# Patient Record
Sex: MALE | Race: WHITE | NOT HISPANIC OR LATINO | Employment: FULL TIME | URBAN - METROPOLITAN AREA
[De-identification: names, ages, dates, MRNs, and addresses within clinical notes are randomized per-mention and may not be internally consistent; named-entity substitution may affect disease eponyms.]

---

## 2021-07-10 ENCOUNTER — APPOINTMENT (OUTPATIENT)
Dept: CT IMAGING | Facility: HOSPITAL | Age: 31
End: 2021-07-10

## 2021-07-10 ENCOUNTER — HOSPITAL ENCOUNTER (EMERGENCY)
Facility: HOSPITAL | Age: 31
Discharge: HOME | End: 2021-07-10
Attending: EMERGENCY MEDICINE | Admitting: EMERGENCY MEDICINE

## 2021-07-10 VITALS
OXYGEN SATURATION: 100 % | HEIGHT: 76 IN | BODY MASS INDEX: 28.43 KG/M2 | HEART RATE: 95 BPM | RESPIRATION RATE: 18 BRPM | SYSTOLIC BLOOD PRESSURE: 155 MMHG | DIASTOLIC BLOOD PRESSURE: 94 MMHG | WEIGHT: 233.47 LBS | TEMPERATURE: 97.8 F

## 2021-07-10 DIAGNOSIS — N20.1 LEFT URETERAL CALCULUS: Primary | ICD-10-CM

## 2021-07-10 DIAGNOSIS — N20.0 KIDNEY STONE: ICD-10-CM

## 2021-07-10 LAB
ALBUMIN SERPL-MCNC: 4.1 G/DL (ref 3.5–5.2)
ALBUMIN/GLOB SERPL: 1.8 G/DL
ALP SERPL-CCNC: 126 U/L (ref 39–117)
ALT SERPL W P-5'-P-CCNC: 21 U/L (ref 1–41)
ANION GAP SERPL CALCULATED.3IONS-SCNC: 10.5 MMOL/L (ref 5–15)
AST SERPL-CCNC: 24 U/L (ref 1–40)
BACTERIA UR QL AUTO: ABNORMAL /HPF
BASOPHILS # BLD AUTO: 0.01 10*3/MM3 (ref 0–0.2)
BASOPHILS NFR BLD AUTO: 0.1 % (ref 0–1.5)
BILIRUB SERPL-MCNC: 0.4 MG/DL (ref 0–1.2)
BILIRUB UR QL STRIP: NEGATIVE
BUN SERPL-MCNC: 11 MG/DL (ref 6–20)
BUN/CREAT SERPL: 12.1 (ref 7–25)
CALCIUM SPEC-SCNC: 9.1 MG/DL (ref 8.6–10.5)
CHLORIDE SERPL-SCNC: 107 MMOL/L (ref 98–107)
CLARITY UR: CLEAR
CO2 SERPL-SCNC: 24.5 MMOL/L (ref 22–29)
COLOR UR: ABNORMAL
CREAT SERPL-MCNC: 0.91 MG/DL (ref 0.76–1.27)
DEPRECATED RDW RBC AUTO: 39.1 FL (ref 37–54)
EOSINOPHIL # BLD AUTO: 0.01 10*3/MM3 (ref 0–0.4)
EOSINOPHIL NFR BLD AUTO: 0.1 % (ref 0.3–6.2)
ERYTHROCYTE [DISTWIDTH] IN BLOOD BY AUTOMATED COUNT: 13.2 % (ref 12.3–15.4)
GFR SERPL CREATININE-BSD FRML MDRD: 96 ML/MIN/1.73
GLOBULIN UR ELPH-MCNC: 2.3 GM/DL
GLUCOSE SERPL-MCNC: 94 MG/DL (ref 65–99)
GLUCOSE UR STRIP-MCNC: NEGATIVE MG/DL
HCT VFR BLD AUTO: 40.4 % (ref 37.5–51)
HGB BLD-MCNC: 13.6 G/DL (ref 13–17.7)
HGB UR QL STRIP.AUTO: ABNORMAL
HOLD SPECIMEN: NORMAL
HOLD SPECIMEN: NORMAL
HYALINE CASTS UR QL AUTO: ABNORMAL /LPF
IMM GRANULOCYTES # BLD AUTO: 0.03 10*3/MM3 (ref 0–0.05)
IMM GRANULOCYTES NFR BLD AUTO: 0.4 % (ref 0–0.5)
KETONES UR QL STRIP: NEGATIVE
LEUKOCYTE ESTERASE UR QL STRIP.AUTO: NEGATIVE
LIPASE SERPL-CCNC: 29 U/L (ref 13–60)
LYMPHOCYTES # BLD AUTO: 4.05 10*3/MM3 (ref 0.7–3.1)
LYMPHOCYTES NFR BLD AUTO: 48.3 % (ref 19.6–45.3)
MCH RBC QN AUTO: 28.1 PG (ref 26.6–33)
MCHC RBC AUTO-ENTMCNC: 33.7 G/DL (ref 31.5–35.7)
MCV RBC AUTO: 83.5 FL (ref 79–97)
MONOCYTES # BLD AUTO: 0.82 10*3/MM3 (ref 0.1–0.9)
MONOCYTES NFR BLD AUTO: 9.8 % (ref 5–12)
NEUTROPHILS NFR BLD AUTO: 3.46 10*3/MM3 (ref 1.7–7)
NEUTROPHILS NFR BLD AUTO: 41.3 % (ref 42.7–76)
NITRITE UR QL STRIP: NEGATIVE
NRBC BLD AUTO-RTO: 0 /100 WBC (ref 0–0.2)
PH UR STRIP.AUTO: 5.5 [PH] (ref 5–8)
PLATELET # BLD AUTO: 186 10*3/MM3 (ref 140–450)
PMV BLD AUTO: 12 FL (ref 6–12)
POTASSIUM SERPL-SCNC: 3.9 MMOL/L (ref 3.5–5.2)
PROT SERPL-MCNC: 6.4 G/DL (ref 6–8.5)
PROT UR QL STRIP: NEGATIVE
RBC # BLD AUTO: 4.84 10*6/MM3 (ref 4.14–5.8)
RBC # UR: ABNORMAL /HPF
REF LAB TEST METHOD: ABNORMAL
SODIUM SERPL-SCNC: 142 MMOL/L (ref 136–145)
SP GR UR STRIP: 1.03 (ref 1–1.03)
SQUAMOUS #/AREA URNS HPF: ABNORMAL /HPF
UROBILINOGEN UR QL STRIP: ABNORMAL
WBC # BLD AUTO: 8.38 10*3/MM3 (ref 3.4–10.8)
WBC UR QL AUTO: ABNORMAL /HPF
WHOLE BLOOD HOLD SPECIMEN: NORMAL

## 2021-07-10 PROCEDURE — 25010000002 ONDANSETRON PER 1 MG: Performed by: EMERGENCY MEDICINE

## 2021-07-10 PROCEDURE — 99283 EMERGENCY DEPT VISIT LOW MDM: CPT

## 2021-07-10 PROCEDURE — 96376 TX/PRO/DX INJ SAME DRUG ADON: CPT

## 2021-07-10 PROCEDURE — 85025 COMPLETE CBC W/AUTO DIFF WBC: CPT

## 2021-07-10 PROCEDURE — 80053 COMPREHEN METABOLIC PANEL: CPT

## 2021-07-10 PROCEDURE — 25010000002 KETOROLAC TROMETHAMINE PER 15 MG: Performed by: EMERGENCY MEDICINE

## 2021-07-10 PROCEDURE — 96375 TX/PRO/DX INJ NEW DRUG ADDON: CPT

## 2021-07-10 PROCEDURE — 25010000002 HYDROMORPHONE 1 MG/ML SOLUTION: Performed by: EMERGENCY MEDICINE

## 2021-07-10 PROCEDURE — 74176 CT ABD & PELVIS W/O CONTRAST: CPT

## 2021-07-10 PROCEDURE — 74176 CT ABD & PELVIS W/O CONTRAST: CPT | Performed by: RADIOLOGY

## 2021-07-10 PROCEDURE — 96374 THER/PROPH/DIAG INJ IV PUSH: CPT

## 2021-07-10 PROCEDURE — 81001 URINALYSIS AUTO W/SCOPE: CPT | Performed by: EMERGENCY MEDICINE

## 2021-07-10 PROCEDURE — 83690 ASSAY OF LIPASE: CPT

## 2021-07-10 RX ORDER — KETOROLAC TROMETHAMINE 30 MG/ML
30 INJECTION, SOLUTION INTRAMUSCULAR; INTRAVENOUS ONCE
Status: COMPLETED | OUTPATIENT
Start: 2021-07-10 | End: 2021-07-10

## 2021-07-10 RX ORDER — PROPRANOLOL HYDROCHLORIDE 10 MG/1
10 TABLET ORAL 2 TIMES DAILY
COMMUNITY
End: 2021-07-30

## 2021-07-10 RX ORDER — ONDANSETRON 4 MG/1
4 TABLET, ORALLY DISINTEGRATING ORAL EVERY 8 HOURS PRN
Qty: 12 TABLET | Refills: 0 | Status: SHIPPED | OUTPATIENT
Start: 2021-07-10 | End: 2021-07-10 | Stop reason: SDUPTHER

## 2021-07-10 RX ORDER — ONDANSETRON 2 MG/ML
4 INJECTION INTRAMUSCULAR; INTRAVENOUS ONCE
Status: COMPLETED | OUTPATIENT
Start: 2021-07-10 | End: 2021-07-10

## 2021-07-10 RX ORDER — HYDROCODONE BITARTRATE AND ACETAMINOPHEN 5; 325 MG/1; MG/1
1 TABLET ORAL EVERY 6 HOURS PRN
Qty: 15 TABLET | Refills: 0 | Status: SHIPPED | OUTPATIENT
Start: 2021-07-10 | End: 2021-07-10 | Stop reason: SDUPTHER

## 2021-07-10 RX ORDER — HYDROCODONE BITARTRATE AND ACETAMINOPHEN 5; 325 MG/1; MG/1
1 TABLET ORAL EVERY 6 HOURS PRN
Qty: 15 TABLET | Refills: 0 | Status: SHIPPED | OUTPATIENT
Start: 2021-07-10 | End: 2021-07-30

## 2021-07-10 RX ORDER — SODIUM CHLORIDE 0.9 % (FLUSH) 0.9 %
10 SYRINGE (ML) INJECTION AS NEEDED
Status: DISCONTINUED | OUTPATIENT
Start: 2021-07-10 | End: 2021-07-10 | Stop reason: HOSPADM

## 2021-07-10 RX ORDER — ONDANSETRON 4 MG/1
4 TABLET, ORALLY DISINTEGRATING ORAL EVERY 8 HOURS PRN
Qty: 12 TABLET | Refills: 0 | Status: SHIPPED | OUTPATIENT
Start: 2021-07-10 | End: 2021-07-30

## 2021-07-10 RX ADMIN — HYDROMORPHONE HYDROCHLORIDE 1 MG: 1 INJECTION, SOLUTION INTRAMUSCULAR; INTRAVENOUS; SUBCUTANEOUS at 13:17

## 2021-07-10 RX ADMIN — KETOROLAC TROMETHAMINE 30 MG: 30 INJECTION, SOLUTION INTRAMUSCULAR at 11:47

## 2021-07-10 RX ADMIN — HYDROMORPHONE HYDROCHLORIDE 1 MG: 1 INJECTION, SOLUTION INTRAMUSCULAR; INTRAVENOUS; SUBCUTANEOUS at 14:55

## 2021-07-10 RX ADMIN — SODIUM CHLORIDE 1000 ML: 9 INJECTION, SOLUTION INTRAVENOUS at 11:39

## 2021-07-10 RX ADMIN — ONDANSETRON 4 MG: 2 INJECTION INTRAMUSCULAR; INTRAVENOUS at 11:39

## 2021-07-10 RX ADMIN — HYDROMORPHONE HYDROCHLORIDE 1 MG: 1 INJECTION, SOLUTION INTRAMUSCULAR; INTRAVENOUS; SUBCUTANEOUS at 11:39

## 2021-07-10 NOTE — ED NOTES
FLANK PAIN X6 HOURS.   HISTORY OF 8 KIDNEY STONES IN THE PAST.  HISTORY OF LITHOTRIPSY.  NORMALLY SEES DR. YOU AT [facility].  LAST STONE WAS 2 YEARS AGO.      Shaniqua Fernandez RN  07/10/21 1121

## 2021-07-10 NOTE — ED PROVIDER NOTES
"Time: 11:16 AM EDT  Arrived by: Car  Chief Complaint: Flank pain  History provided by: Patient  History is limited by: N/A    History of Present Illness:    Javier Arriola is a 31 y.o. male who presents to the emergency department today with complaints of left flank pain since 0500 this morning. The patient reports that he does have a history of kidney stones with his last episode two years ago. He has had kidney stones broken up surgically in the past, though he denies a history of any other abdominal surgeries.    The patient presently denies any nausea, vomiting, fever, or urinary symptoms. He sees [facility] in [place], [place]. He is currently waiting to see an endocrinologist in September 2021 for his Graves Disease and is currently taking propanolol. Per triage, the patient denies smoking, drinking, or drug use. There are no other acute complaints at this time.      History provided by:  Patient   used: No    Flank Pain  Pain location:  L flank  Pain quality comment:  \"pain\"  Pain radiates to:  Does not radiate  Pain severity:  Moderate  Onset quality:  Sudden  Duration:  6 hours  Timing:  Constant  Progression:  Unchanged  Chronicity:  Recurrent (History of kidney stones)  Context: awakening from sleep    Context: not alcohol use    Relieved by:  None tried  Worsened by:  Nothing  Ineffective treatments:  None tried  Associated symptoms: no chest pain, no chills, no cough, no diarrhea, no dysuria, no fever, no hematuria, no nausea, no shortness of breath and no vomiting    Risk factors: no alcohol abuse, not elderly and has not had multiple surgeries            Similar Symptoms Previously: Yes.  Recently seen: Per his records, the patient had [facility].      Patient Care Team  Primary Care Provider: Joellen Rojas    Past Medical History:     Allergies   Allergen Reactions   • Tetracyclines & Related Hives     Past Medical History: " "  Diagnosis Date   • Graves disease    • Kidney stone      History reviewed. No pertinent surgical history.  History reviewed. No pertinent family history.    Home Medications:  Prior to Admission medications    Not on File        Social History:   PT  reports that he has never smoked. He has never used smokeless tobacco. He reports that he does not drink alcohol and does not use drugs.    Record Review:  I have reviewed the patient's records in [email or web address].     Review of Systems  Review of Systems   Constitutional: Negative for chills and fever.   HENT: Negative for nosebleeds.    Eyes: Negative for redness.   Respiratory: Negative for cough and shortness of breath.    Cardiovascular: Negative for chest pain.   Gastrointestinal: Negative for diarrhea, nausea and vomiting.   Genitourinary: Positive for flank pain (left). Negative for decreased urine volume, difficulty urinating, dysuria, frequency, hematuria and urgency.   Musculoskeletal: Negative for back pain and neck pain.   Skin: Negative for rash.   Neurological: Negative for seizures.   All other systems reviewed and are negative.       Physical Exam  /91   Pulse 81   Temp 97.5 °F (36.4 °C) (Oral)   Resp 18   Ht 193 cm (76\")   Wt 106 kg (233 lb 7.5 oz)   SpO2 99%   BMI 28.42 kg/m²     Physical Exam  Vitals and nursing note reviewed.   Constitutional:       General: He is in acute distress (moderate).   HENT:      Head: Normocephalic and atraumatic.      Nose: Nose normal.      Mouth/Throat:      Mouth: Mucous membranes are moist.   Eyes:      General: No scleral icterus.  Cardiovascular:      Rate and Rhythm: Normal rate and regular rhythm.      Heart sounds: Normal heart sounds. No murmur heard.     Pulmonary:      Effort: No respiratory distress.      Breath sounds: Normal breath sounds.   Abdominal:      Palpations: Abdomen is soft.      Tenderness: There is left CVA tenderness. There is no right CVA tenderness.      Hernia: No hernia is present.     " " Comments: No other tenderness.   Musculoskeletal:         General: No tenderness. Normal range of motion.      Cervical back: Normal range of motion and neck supple. No tenderness.      Right lower leg: No edema.      Left lower leg: No edema.   Skin:     General: Skin is warm and dry.   Neurological:      Mental Status: He is alert. Mental status is at baseline.      Sensory: No sensory deficit.      Motor: No weakness.   Psychiatric:         Behavior: Behavior normal.                  ED Course  /91   Pulse 81   Temp 97.5 °F (36.4 °C) (Oral)   Resp 18   Ht 193 cm (76\")   Wt 106 kg (233 lb 7.5 oz)   SpO2 99%   BMI 28.42 kg/m²   Results for orders placed or performed during the hospital encounter of 07/10/21   Comprehensive Metabolic Panel    Specimen: Blood   Result Value Ref Range    Glucose 94 65 - 99 mg/dL    BUN 11 6 - 20 mg/dL    Creatinine 0.91 0.76 - 1.27 mg/dL    Sodium 142 136 - 145 mmol/L    Potassium 3.9 3.5 - 5.2 mmol/L    Chloride 107 98 - 107 mmol/L    CO2 24.5 22.0 - 29.0 mmol/L    Calcium 9.1 8.6 - 10.5 mg/dL    Total Protein 6.4 6.0 - 8.5 g/dL    Albumin 4.10 3.50 - 5.20 g/dL    ALT (SGPT) 21 1 - 41 U/L    AST (SGOT) 24 1 - 40 U/L    Alkaline Phosphatase 126 (H) 39 - 117 U/L    Total Bilirubin 0.4 0.0 - 1.2 mg/dL    eGFR Non African Amer 96 >60 mL/min/1.73    Globulin 2.3 gm/dL    A/G Ratio 1.8 g/dL    BUN/Creatinine Ratio 12.1 7.0 - 25.0    Anion Gap 10.5 5.0 - 15.0 mmol/L   Lipase    Specimen: Blood   Result Value Ref Range    Lipase 29 13 - 60 U/L   Urinalysis With Microscopic If Indicated (No Culture) -    Specimen: Urine   Result Value Ref Range    Color, UA Dark Yellow (A) Yellow, Straw    Appearance, UA Clear Clear    pH, UA 5.5 5.0 - 8.0    Specific Gravity, UA 1.026 1.005 - 1.030    Glucose, UA Negative Negative    Ketones, UA Negative Negative    Bilirubin, UA Negative Negative    Blood, UA Moderate (2+) (A) Negative    Protein, UA Negative Negative    Leuk Esterase, UA " Negative Negative    Nitrite, UA Negative Negative    Urobilinogen, UA 1.0 E.U./dL 0.2 - 1.0 E.U./dL   CBC Auto Differential    Specimen: Blood   Result Value Ref Range    WBC 8.38 3.40 - 10.80 10*3/mm3    RBC 4.84 4.14 - 5.80 10*6/mm3    Hemoglobin 13.6 13.0 - 17.7 g/dL    Hematocrit 40.4 37.5 - 51.0 %    MCV 83.5 79.0 - 97.0 fL    MCH 28.1 26.6 - 33.0 pg    MCHC 33.7 31.5 - 35.7 g/dL    RDW 13.2 12.3 - 15.4 %    RDW-SD 39.1 37.0 - 54.0 fl    MPV 12.0 6.0 - 12.0 fL    Platelets 186 140 - 450 10*3/mm3    Neutrophil % 41.3 (L) 42.7 - 76.0 %    Lymphocyte % 48.3 (H) 19.6 - 45.3 %    Monocyte % 9.8 5.0 - 12.0 %    Eosinophil % 0.1 (L) 0.3 - 6.2 %    Basophil % 0.1 0.0 - 1.5 %    Immature Grans % 0.4 0.0 - 0.5 %    Neutrophils, Absolute 3.46 1.70 - 7.00 10*3/mm3    Lymphocytes, Absolute 4.05 (H) 0.70 - 3.10 10*3/mm3    Monocytes, Absolute 0.82 0.10 - 0.90 10*3/mm3    Eosinophils, Absolute 0.01 0.00 - 0.40 10*3/mm3    Basophils, Absolute 0.01 0.00 - 0.20 10*3/mm3    Immature Grans, Absolute 0.03 0.00 - 0.05 10*3/mm3    nRBC 0.0 0.0 - 0.2 /100 WBC   Urinalysis, Microscopic Only - Urine, Clean Catch    Specimen: Urine   Result Value Ref Range    RBC, UA 31-50 (A) None Seen /HPF    WBC, UA 0-2 (A) None Seen /HPF    Bacteria, UA None Seen None Seen /HPF    Squamous Epithelial Cells, UA 0-2 None Seen, 0-2 /HPF    Hyaline Casts, UA 3-6 None Seen /LPF    Methodology Automated Microscopy    Green Top (Gel)   Result Value Ref Range    Extra Tube Hold for add-ons.    Lavender Top   Result Value Ref Range    Extra Tube hold for add-on    Gold Top - SST   Result Value Ref Range    Extra Tube Hold for add-ons.      Medications   sodium chloride 0.9 % flush 10 mL (has no administration in time range)   HYDROmorphone (DILAUDID) injection 1 mg (1 mg Intravenous Given 7/10/21 1139)   ondansetron (ZOFRAN) injection 4 mg (4 mg Intravenous Given 7/10/21 1139)   ketorolac (TORADOL) injection 30 mg (30 mg Intravenous Given 7/10/21 1147)    sodium chloride 0.9 % bolus 1,000 mL (1,000 mL Intravenous New Bag 7/10/21 1139)   HYDROmorphone (DILAUDID) injection 1 mg (1 mg Intravenous Given 7/10/21 1317)     CT Abdomen Pelvis Without Contrast    Result Date: 7/10/2021  Narrative: PROCEDURE: CT ABDOMEN PELVIS WO CONTRAST  COMPARISON: None  INDICATIONS: Flank pain, kidney stone suspected  TECHNIQUE: CT images were created without intravenous contrast.   PROTOCOL:   Standard imaging protocol performed    RADIATION:   DLP: 699.5 mGy*cm   Automated exposure control was utilized to minimize radiation dose.  FINDINGS:  The lung bases are clear bilaterally.  The liver, gallbladder, spleen, pancreas and adrenal glands appear unremarkable.  A few punctate nonobstructing right renal stones are noted.  In the proximal left ureter is a 0.4 cm stone.  Mild left hydronephrosis is noted.  Both kidneys otherwise appear unremarkable.  No adenopathy is seen.  No free fluid is present.  No acute bowel abnormality is seen.  The lack of oral contrast limits evaluation of the bowel.  No focal osseous lesion is identified.  CONCLUSION:  1. 0.4 cm proximal left ureteral stone producing mild hydronephrosis 2. Punctate nonobstructing right renal stones     John Swain M.D.       Electronically Signed and Approved By: John Swain M.D. on 7/10/2021 at 12:30               Procedures/EKGs:  Procedures          Medical Decision Making:                     MDM  Number of Diagnoses or Management Options  Diagnosis management comments: Patient is pain-free after pain medication and IV fluids in the emergency room.  The patient has a point for centimeter calculus in the left proximal ureter.  The patient has no signs of infection subsequently be discharged home on pain medications he is also instructed drink plenty fluids, strain his urine, return for persistent vomiting fever or uncontrolled pain.  Otherwise he is to follow-up with Dr. Segura as directed       Amount and/or Complexity of  Data Reviewed  Clinical lab tests: reviewed  Tests in the radiology section of CPT®: reviewed    Patient Progress  Patient progress: improved       Final diagnoses:   Left ureteral calculus   Kidney stone        Disposition:  ED Disposition     ED Disposition Condition Comment    Discharge Stable           Documentation assistance provided by Deanna Frank acting as scribe for Yifan Poole DO. Information recorded by the scribe was done at my direction and has been verified and validated by me.        Deanna Frank  07/10/21 1134              Yifan Poole DO  07/10/21 1425

## 2021-07-30 ENCOUNTER — OFFICE VISIT (OUTPATIENT)
Dept: ORTHOPEDIC SURGERY | Facility: CLINIC | Age: 31
End: 2021-07-30

## 2021-07-30 VITALS — TEMPERATURE: 98 F | WEIGHT: 233.69 LBS | BODY MASS INDEX: 28.46 KG/M2 | HEIGHT: 76 IN

## 2021-07-30 DIAGNOSIS — M54.50 LUMBAR PAIN: ICD-10-CM

## 2021-07-30 DIAGNOSIS — R52 PAIN: ICD-10-CM

## 2021-07-30 DIAGNOSIS — M54.32 SCIATICA OF LEFT SIDE: Primary | ICD-10-CM

## 2021-07-30 DIAGNOSIS — M43.16 SPONDYLOLISTHESIS OF LUMBAR REGION: ICD-10-CM

## 2021-07-30 DIAGNOSIS — M51.26 HERNIATED LUMBAR INTERVERTEBRAL DISC: ICD-10-CM

## 2021-07-30 PROCEDURE — 72100 X-RAY EXAM L-S SPINE 2/3 VWS: CPT | Performed by: ORTHOPAEDIC SURGERY

## 2021-07-30 PROCEDURE — 99204 OFFICE O/P NEW MOD 45 MIN: CPT | Performed by: ORTHOPAEDIC SURGERY

## 2021-07-30 RX ORDER — OXYCODONE HYDROCHLORIDE AND ACETAMINOPHEN 5; 325 MG/1; MG/1
TABLET ORAL
COMMUNITY
Start: 2021-07-28 | End: 2021-09-21

## 2021-08-16 ENCOUNTER — TELEPHONE (OUTPATIENT)
Dept: ORTHOPEDIC SURGERY | Facility: CLINIC | Age: 31
End: 2021-08-16

## 2021-08-16 ENCOUNTER — TELEPHONE (OUTPATIENT)
Dept: PHYSICAL THERAPY | Facility: CLINIC | Age: 31
End: 2021-08-16

## 2021-08-16 NOTE — TELEPHONE ENCOUNTER
Patient is requesting a work note to keep him off work until his next follow up on 10-1-21. Please advise.

## 2021-08-17 NOTE — TELEPHONE ENCOUNTER
No I will keep him off 1 month from the time I saw him.  I told to make a follow-up appointment in 1 month.  Not 2-1/2 months.

## 2021-08-17 NOTE — TELEPHONE ENCOUNTER
Patient called back and rescheduled his follow up to 9-21-21 at [place], which was the next avaialble. Can he have a work note keeping him off until this appointment since it was the soonest he could get in? Please advise.

## 2021-08-23 NOTE — TELEPHONE ENCOUNTER
Patient informed of JGW prior message verbalizing understanding and with no further questions or concerns.

## 2021-09-08 ENCOUNTER — TREATMENT (OUTPATIENT)
Dept: PHYSICAL THERAPY | Facility: CLINIC | Age: 31
End: 2021-09-08

## 2021-09-08 DIAGNOSIS — M54.50 LUMBAR PAIN: Primary | ICD-10-CM

## 2021-09-08 PROCEDURE — 97161 PT EVAL LOW COMPLEX 20 MIN: CPT | Performed by: PHYSICAL THERAPIST

## 2021-09-08 PROCEDURE — 97110 THERAPEUTIC EXERCISES: CPT | Performed by: PHYSICAL THERAPIST

## 2021-09-08 NOTE — PROGRESS NOTES
Physical Therapy Initial Evaluation and Plan of Care      Patient: Javier Arriola   : 1990  Diagnosis/ICD-10 Code:  Lumbar pain [M54.5]  Referring practitioner: Sebastien Lozada MD  Date of Initial Visit: 2021  Today's Date: 2021  Patient seen for 1 sessions           Subjective Questionnaire: Oswestry: 24/50=48% limitation      Subjective Evaluation    History of Present Illness  Mechanism of injury: Patient is a 31 yr old male referred to physical therapy with diagnosis of lumbar pain.  Patient injured back in 2021 at work.  Patient states pain/burning in low back and some tingling in left leg.      Pain  Current pain ratin  At best pain ratin  At worst pain ratin  Quality: burning and grinding  Relieving factors: medications, change in position, ice and heat    Patient Goals  Patient goals for therapy: decreased pain, return to work, increased motion and independence with ADLs/IADLs             Objective          Static Posture     Scapulae  Left winging and right winging.    Thoracic Spine  Flattened thoracic spine.    Lumbar Spine   Increased lordosis.     Palpation   Left   Tenderness of the erector spinae and quadratus lumborum.     Right Tenderness of the erector spinae and quadratus lumborum.     Neurological Testing     Additional Neurological Details  Noted neural tension in left LE    Active Range of Motion     Lumbar   Flexion: with pain  Extension: with pain  Left lateral flexion: with pain  Right lateral flexion: with pain  Left rotation: with pain  Right rotation: with pain    Additional Active Range of Motion Details  Patient demonstrates 25% of lumbar AROM secondary to pain.    Strength/Myotome Testing     Left Hip   Planes of Motion   Flexion: 4-  Extension: 4-  Abduction: 4-    Right Hip   Planes of Motion   Flexion: 4-  Extension: 4-  Abduction: 4-    Left Knee   Flexion: 4  Extension: 4    Right Knee   Flexion: 4  Extension: 4    Left Ankle/Foot    Dorsiflexion: 4    Right Ankle/Foot   Dorsiflexion: 4    Lumbar Flexibility Comments:   Noted bilateral hamstring tightness          Assessment & Plan     Assessment  Impairments: activity intolerance, impaired physical strength, lacks appropriate home exercise program and pain with function  Assessment details: Pt presents with limitations, noted by evaluation that impede patient's tolerance to functional mobility/activity.  The skills of a therapist will be required to safely and effectively implement the following treatment plan to restore maximal level of function.    Prognosis: good  Functional Limitations: carrying objects, lifting, walking, uncomfortable because of pain, standing and unable to perform repetitive tasks  Goals  Plan Goals: 1. The patient has complaints of pain.   LTG 1: 8 weeks:  The patient will report lower back pain no greater than 2/10 in order to improve tolerance with activities of daily living and improve sleep quality.   STATUS:  New   STG 1a: 4 weeks:  The patient will report lower back pain no greater than 4/10.    STATUS:  New      2. The patient has limited lumbar spine ROM.   LTG 2:8 weeks: The patient will improve lumbar spine ROM to 100% in order to improve tolerance with activities of daily living.   STATUS: New   STG 2a:4 weeks: The patient will improve lumbar spine ROM to 50% in order to improve tolerance with activities of daily living.   STATUS: New      3. The patient demonstrates weakness of the bilateral hip.   LTG 3: 8 weeks:  The patient will demonstrate 5/5 strength for bilateral hip flexion, abduction, and extension in order to improve hip stability.   STATUS:  New   STG 3a: 4 weeks:  The patient will demonstrate 4+/5 strength for bilateral hip flexion, abduction, and extension.   STATUS:  New      4. Mobility: Walking/Moving Around Functional Limitation     LTG 4: 8 weeks:  The patient will demonstrate 20% limitation by achieving a score of 10/50 on the Oswestry  Disability Questionnaire.   STATUS:  New   STG 4a: 4 weeks:  The patient will demonstrate 30% limitation by achieving a score of 15/50 on the Oswestry Disability Quiestionnaire.     STATUS:  New     TREATMENT:  Manual therapy, therapeutic exercise, home exercise instruction, aquatic therapy, pelvic traction, and modalities as needed to include: electrical stimulation, ultrasound, moist heat, and ice.       Plan  Therapy options: will be seen for skilled physical therapy services  Planned modality interventions: traction and TENS  Planned therapy interventions: abdominal trunk stabilization, manual therapy, soft tissue mobilization, spinal/joint mobilization, strengthening, stretching, functional ROM exercises, flexibility and home exercise program  Frequency: 2x week  Duration in weeks: 8  Treatment plan discussed with: patient        Visit Diagnoses:    ICD-10-CM ICD-9-CM   1. Lumbar pain  M54.5 724.2       Timed:  Manual Therapy:         mins  89022;  Therapeutic Exercise:    10     mins  38089;     Neuromuscular Svetlana:        mins  50971;    Therapeutic Activity:          mins  11390;     Gait Training:           mins  72139;     Ultrasound:          mins  59381;    Electrical Stimulation:         mins  96377 ( );    Untimed:  Electrical Stimulation:         mins  30058 ( );  Mechanical Traction:         mins  54675;   PT evaluation   30 mins    Timed Treatment:   10   mins   Total Treatment:     40   mins    PT SIGNATURE: Naomi Sol PT         Initial Certification  Certification Period: 9/8/2021 thru 12/7/2021  I certify that the therapy services are furnished while this patient is under my care.  The services outlined above are required by this patient, and will be reviewed every 90 days.     PHYSICIAN: Sebastien Lozada MD      DATE:     Please sign and return via fax to [phone]  Thank you, [facility].

## 2021-09-10 ENCOUNTER — TREATMENT (OUTPATIENT)
Dept: PHYSICAL THERAPY | Facility: CLINIC | Age: 31
End: 2021-09-10

## 2021-09-10 DIAGNOSIS — M54.50 LUMBAR PAIN: Primary | ICD-10-CM

## 2021-09-10 PROCEDURE — 97012 MECHANICAL TRACTION THERAPY: CPT | Performed by: PHYSICAL THERAPIST

## 2021-09-10 NOTE — PROGRESS NOTES
Physical Therapy Daily Treatment Note      Patient: Javier Arriola   : 1990  Referring practitioner: Sebastien Lozada MD  Date of Initial Visit: Type: THERAPY  Noted: 2021  Today's Date: 9/10/2021  Patient seen for 2 sessions           Subjective   Javier Arriola reports: back pain 8/10 today.    Objective   See Exercise, Manual, and Modality Logs for complete treatment.       Assessment & Plan     Assessment  Prognosis details: Patient reports mechanical pelvic traction felt good during, but had some discomfort/stiffness after getting off traction.         Visit Diagnoses:    ICD-10-CM ICD-9-CM   1. Lumbar pain  M54.5 724.2       Progress per Plan of Care           Timed:  Manual Therapy:         mins  05930;  Therapeutic Exercise:         mins  39850;     Neuromuscular Svetlana:        mins  88515;    Therapeutic Activity:          mins  31638;     Gait Training:           mins  35166;     Ultrasound:          mins  18627;    Electrical Stimulation:         mins  99869 ( );    Untimed:  Electrical Stimulation:         mins  71537 ( );  Mechanical Traction:    15     mins  21567;     Timed Treatment:      mins   Total Treatment:     15   mins  Naomi Sol PT  Physical Therapist

## 2021-09-13 ENCOUNTER — TREATMENT (OUTPATIENT)
Dept: PHYSICAL THERAPY | Facility: CLINIC | Age: 31
End: 2021-09-13

## 2021-09-13 DIAGNOSIS — M54.50 LUMBAR PAIN: Primary | ICD-10-CM

## 2021-09-13 PROCEDURE — 97140 MANUAL THERAPY 1/> REGIONS: CPT | Performed by: PHYSICAL THERAPIST

## 2021-09-13 PROCEDURE — 97012 MECHANICAL TRACTION THERAPY: CPT | Performed by: PHYSICAL THERAPIST

## 2021-09-13 NOTE — PROGRESS NOTES
Physical Therapy Daily Treatment Note      Patient: Javier Arriola   : 1990  Referring practitioner: No ref. provider found  Date of Initial Visit: Type: THERAPY  Noted: 2021  Today's Date: 2021  Patient seen for 3 sessions           Subjective Questionnaire:       Subjective Evaluation    History of Present Illness    Subjective comment: Pt reported feeling  7/10 pain today across low back.  Pt reported that back hurt the rest of the last day after        Objective   See Exercise, Manual, and Modality Logs for complete treatment.       Assessment/Plan    Visit Diagnoses:  No diagnosis found.               Timed:  Manual Therapy:    8     mins  92440;  Therapeutic Exercise:         mins  04336;     Neuromuscular Svetlana:        mins  26904;    Therapeutic Activity:          mins  77086;     Gait Training:           mins  44800;     Ultrasound:          mins  20770;    Electrical Stimulation:         mins  22633 ( );  Aquatic Therapy          mins  71793    Untimed:  Electrical Stimulation:         mins  20759 ( );  Mechanical Traction:    15     mins  44643;     Timed Treatment:   23   mins   Total Treatment:     23   mins  Liseth Spears PTA  Physical Therapist

## 2021-09-15 ENCOUNTER — TREATMENT (OUTPATIENT)
Dept: PHYSICAL THERAPY | Facility: CLINIC | Age: 31
End: 2021-09-15

## 2021-09-15 DIAGNOSIS — M54.50 LUMBAR PAIN: Primary | ICD-10-CM

## 2021-09-15 PROCEDURE — 97012 MECHANICAL TRACTION THERAPY: CPT | Performed by: PHYSICAL THERAPIST

## 2021-09-15 PROCEDURE — 97110 THERAPEUTIC EXERCISES: CPT | Performed by: PHYSICAL THERAPIST

## 2021-09-17 ENCOUNTER — TELEPHONE (OUTPATIENT)
Dept: ORTHOPEDIC SURGERY | Facility: CLINIC | Age: 31
End: 2021-09-17

## 2021-09-17 NOTE — TELEPHONE ENCOUNTER
"Provider:  DR. BECKIE BAUM  Caller:  JOSE DAN  Relationship to Patient:  SELF  Pharmacy:  [facility] \"[place] [phone]  Phone Number:  [phone]  Reason for Call:  BACK PAIN. DOING PHYSICAL THERAPY. ASKING IF JGW WOULD PRESCRIBE PAIN RX.    "

## 2021-09-20 ENCOUNTER — TREATMENT (OUTPATIENT)
Dept: PHYSICAL THERAPY | Facility: CLINIC | Age: 31
End: 2021-09-20

## 2021-09-20 DIAGNOSIS — M54.50 LUMBAR PAIN: Primary | ICD-10-CM

## 2021-09-20 PROCEDURE — 97012 MECHANICAL TRACTION THERAPY: CPT | Performed by: PHYSICAL THERAPIST

## 2021-09-20 PROCEDURE — 97140 MANUAL THERAPY 1/> REGIONS: CPT | Performed by: PHYSICAL THERAPIST

## 2021-09-20 PROCEDURE — 97014 ELECTRIC STIMULATION THERAPY: CPT | Performed by: PHYSICAL THERAPIST

## 2021-09-20 PROCEDURE — 97110 THERAPEUTIC EXERCISES: CPT | Performed by: PHYSICAL THERAPIST

## 2021-09-20 NOTE — PROGRESS NOTES
Physical Therapy Daily Treatment Note      Patient: Javier Arriola   : 1990  Referring practitioner: No ref. provider found  Date of Initial Visit: Type: THERAPY  Noted: 2021  Today's Date: 2021  Patient seen for 5 sessions           Subjective Questionnaire:       Subjective Evaluation    History of Present Illness    Subjective comment: Pt presents to clinic with 7/10 pain.  Pt reported he feels good the day after tx then goes back to where it was.       Objective   See Exercise, Manual, and Modality Logs for complete treatment.       Assessment & Plan     Assessment  Assessment details: Pt reported increased pain with multifdus walk side stepping L more than R.  Pt reported pain decrease from psoas to 5-6/10 from 7/10.  Pt reported pain down to 5/10 with METS.  Pt had less heavy breaths after pain was decreased.  After There Ex and amnual pt was placed on traction which initially released the pressure.  Upon completion of 15 minute tx on traction pt reported pain returned.  Pt was placed on MH and E-Stim and pain was back down to 5/10.  Pt will se MD tomorrow.        Visit Diagnoses:    ICD-10-CM ICD-9-CM   1. Lumbar pain  M54.5 724.2       Progress per Plan of Care and Progress strengthening /stabilization /functional activity           Timed:  Manual Therapy:    12     mins  94940;  Therapeutic Exercise:    27     mins  90902;     Neuromuscular Svetlana:        mins  49951;    Therapeutic Activity:          mins  58175;     Gait Training:           mins  53969;     Ultrasound:          mins  30961;    Electrical Stimulation:         mins  83429 ( );  Aquatic Therapy          mins  41113    Untimed:  Electrical Stimulation:   15      mins  28782 ( );  Mechanical Traction: 15       mins  86296;     Timed Treatment:   39    mins   Total Treatment:    84   mins  Liseth Spears PTA  Physical Therapist

## 2021-09-21 ENCOUNTER — OFFICE VISIT (OUTPATIENT)
Dept: ORTHOPEDIC SURGERY | Facility: CLINIC | Age: 31
End: 2021-09-21

## 2021-09-21 VITALS — WEIGHT: 235 LBS | HEIGHT: 76 IN | BODY MASS INDEX: 28.62 KG/M2 | TEMPERATURE: 97.5 F

## 2021-09-21 DIAGNOSIS — M54.32 SCIATICA OF LEFT SIDE: ICD-10-CM

## 2021-09-21 DIAGNOSIS — M54.50 LUMBAR PAIN: Primary | ICD-10-CM

## 2021-09-21 DIAGNOSIS — M43.16 SPONDYLOLISTHESIS OF LUMBAR REGION: ICD-10-CM

## 2021-09-21 DIAGNOSIS — M51.26 HERNIATED LUMBAR INTERVERTEBRAL DISC: ICD-10-CM

## 2021-09-21 PROCEDURE — 99213 OFFICE O/P EST LOW 20 MIN: CPT | Performed by: ORTHOPAEDIC SURGERY

## 2021-09-21 RX ORDER — IBUPROFEN 800 MG/1
800 TABLET ORAL EVERY 8 HOURS
COMMUNITY
Start: 2021-09-02 | End: 2022-02-11

## 2021-09-21 RX ORDER — PROPRANOLOL HYDROCHLORIDE 40 MG/1
TABLET ORAL
COMMUNITY
Start: 2021-09-07

## 2021-09-21 NOTE — PROGRESS NOTES
Continued back and leg pain is some better than before after therapy but not much progress.  He is asking for pain medication and this is been going on 6 months.  Good strength in the legs on examination.  Reviewed therapy notes.  I do not see much else to do to accelerate this along.  At some point decompression and fusion would be considered but I had hoped to stay away from surgery.  I recommended continued PT, pain management with Hesham Soriano and Jessie and no work for 4 weeks at which time we will see him back no x-rays needed

## 2021-09-22 ENCOUNTER — TREATMENT (OUTPATIENT)
Dept: PHYSICAL THERAPY | Facility: CLINIC | Age: 31
End: 2021-09-22

## 2021-09-22 DIAGNOSIS — M54.50 LUMBAR PAIN: Primary | ICD-10-CM

## 2021-09-22 PROCEDURE — 97110 THERAPEUTIC EXERCISES: CPT | Performed by: PHYSICAL THERAPIST

## 2021-09-22 PROCEDURE — 97012 MECHANICAL TRACTION THERAPY: CPT | Performed by: PHYSICAL THERAPIST

## 2021-09-22 PROCEDURE — 97014 ELECTRIC STIMULATION THERAPY: CPT | Performed by: PHYSICAL THERAPIST

## 2021-09-22 NOTE — PROGRESS NOTES
Physical Therapy Daily Treatment Note      Patient: Javier Arriola   : 1990  Referring practitioner: No ref. provider found  Date of Initial Visit: Type: THERAPY  Noted: 2021  Today's Date: 2021  Patient seen for 6 sessions           Subjective Questionnaire:       Subjective Evaluation    History of Present Illness    Subjective comment: Pt reported to clinic today stating his pain isn't the sharp pain he has been having but a different pain he attributes to maybe the rain.  Ptr reported pain is 6 or low 7/10.   Pt reported he saw MD and was advised to continue with PT for one more month and also referred to pain management.  Pt stated he will return to MD on 2021.  Pain  Current pain ratin           Objective   See Exercise, Manual, and Modality Logs for complete treatment.       Assessment & Plan     Assessment  Assessment details: Pt's sharp pain returned with tall kneeling anteriorotation.  Pt reported that last tx session after E-stim it kept his pain down the rest of the day.  Pt reported that he had decreased pain the next day as he always does the day following traction.  Pt reported his pain goes down the day following traction and is good for that day.  Pt reported pressure starting with compression of traction belt and with traction.  Pt's pain was 5/10 down from 6-7/10 after traction at previous sessions.  Pt reported pain was better after E-stim.        Visit Diagnoses:    ICD-10-CM ICD-9-CM   1. Lumbar pain  M54.5 724.2       Progress per Plan of Care and Progress strengthening /stabilization /functional activity           Timed:  Manual Therapy:         mins  62430;  Therapeutic Exercise:    23     mins  59355;     Neuromuscular Svetlana:        mins  25040;    Therapeutic Activity:          mins  32507;     Gait Training:           mins  35279;     Ultrasound:          mins  31961;    Electrical Stimulation:         mins  36623 ( );  Aquatic Therapy          mins   01192    Untimed:  Electrical Stimulation:    15     mins  84131 ( );  Mechanical Traction:    15     mins  72011;     Timed Treatment:   23   mins   Total Treatment:     68   mins  Liseth Spears PTA  Physical Therapist

## 2021-09-27 ENCOUNTER — TELEPHONE (OUTPATIENT)
Dept: ORTHOPEDICS | Facility: OTHER | Age: 31
End: 2021-09-27

## 2021-09-29 ENCOUNTER — TREATMENT (OUTPATIENT)
Dept: PHYSICAL THERAPY | Facility: CLINIC | Age: 31
End: 2021-09-29

## 2021-09-29 ENCOUNTER — TELEPHONE (OUTPATIENT)
Dept: ORTHOPEDIC SURGERY | Facility: CLINIC | Age: 31
End: 2021-09-29

## 2021-09-29 DIAGNOSIS — M54.50 LUMBAR PAIN: Primary | ICD-10-CM

## 2021-09-29 PROCEDURE — 97110 THERAPEUTIC EXERCISES: CPT | Performed by: PHYSICAL THERAPIST

## 2021-09-29 PROCEDURE — 97012 MECHANICAL TRACTION THERAPY: CPT | Performed by: PHYSICAL THERAPIST

## 2021-09-29 NOTE — TELEPHONE ENCOUNTER
Caller: Javier Arriola    Relationship to patient: Self    Best call back number: [phone]    Patient is needing: PATIENT WAS REFERRED TO PAIN MGMT BY DR BAUM AND THEY CANNOT GET HIM UNTIL 10/28. HE IS ASKING FOR PAIN MEDICATION UNTIL HE CAN GET IN THERE. PLEASE ADVISE.

## 2021-09-29 NOTE — PROGRESS NOTES
Physical Therapy Daily Treatment Note      Patient: Javier Arriola   : 1990  Referring practitioner: No ref. provider found  Date of Initial Visit: Type: THERAPY  Noted: 2021  Today's Date: 2021  Patient seen for 7 sessions           Subjective Questionnaire:       Subjective Evaluation    History of Present Illness    Subjective comment: Pt is observed to have a slightly easier time with sit to stand transfer.  Pt reported low back pain 7-8/10 and intermittent tingling down LLE.       Objective   See Exercise, Manual, and Modality Logs for complete treatment.       Assessment & Plan     Assessment  Assessment details: Pt reported he goes back to MD on 2021 but can't get into pain medication until the 10/28/21.  Pt was able to transfer pprone to sit with more ease and less pain than before.  Pt reported 5/10 pain after traction and more erect posture with pain 5/10.        Visit Diagnoses:    ICD-10-CM ICD-9-CM   1. Lumbar pain  M54.5 724.2       Progress per Plan of Care and Progress strengthening /stabilization /functional activity           Timed:  Manual Therapy:    6     mins  12544;  Therapeutic Exercise:    12     mins  99539;     Neuromuscular Svetlana:        mins  44911;    Therapeutic Activity:          mins  53672;     Gait Training:           mins  74887;     Ultrasound:          mins  62513;    Electrical Stimulation:         mins  09008 ( );  Aquatic Therapy          mins  57165    Untimed:  Electrical Stimulation:         mins  91841 ( );  Mechanical Traction:    12     mins  16575;     Timed Treatment:   18   mins   Total Treatment:     30   mins  Liseth Spears PTA  Physical Therapist

## 2021-09-29 NOTE — TELEPHONE ENCOUNTER
Provider: DR BAUM    Caller: MARCELA ( [employer])    Relationship to Patient: CARINA VEGA W/C    Phone Number: [phone]    Reason for Call: MARCELA IS CALLING ON BEHALF OF JOSE DAN. PATIENT IS BEING SEEN BY DR BAUM FOR W/C RELATED INJURIES. AND WAS REFERRED TO PAIN MANAGEMENT BUT CANT GET IN UNTIL 10/28 WITH DR SEXTON, THEY ARE WONDERING IF THERE IS A DIFFERENT PROVIDER AT THIS OFFICE THAT HE AN BE REFERRED TO AND GET IN SOONER            MARCELA IS ALSO REQUESTING OFFICE NOTES FROM HIS LAST VISIT ON 9/21/21 FAXED [phone]

## 2021-10-04 ENCOUNTER — TREATMENT (OUTPATIENT)
Dept: PHYSICAL THERAPY | Facility: CLINIC | Age: 31
End: 2021-10-04

## 2021-10-04 DIAGNOSIS — M54.50 LUMBAR PAIN: Primary | ICD-10-CM

## 2021-10-04 PROCEDURE — 97110 THERAPEUTIC EXERCISES: CPT | Performed by: PHYSICAL THERAPIST

## 2021-10-04 PROCEDURE — 97012 MECHANICAL TRACTION THERAPY: CPT | Performed by: PHYSICAL THERAPIST

## 2021-10-04 NOTE — PROGRESS NOTES
Physical Therapy Daily Treatment Note      Patient: Javier Arriola   : 1990  Referring practitioner: No ref. provider found  Date of Initial Visit: Type: THERAPY  Noted: 2021  Today's Date: 10/4/2021  Patient seen for 8 sessions           Subjective Questionnaire:       Subjective Evaluation    History of Present Illness    Subjective comment: Pt reports he saw his pcp last Thursday and was prescribed  300 miligrams of gabapanten a day and that has eased pain reporting 5/10.       Objective   See Exercise, Manual, and Modality Logs for complete treatment.       Assessment & Plan     Assessment  Assessment details: Pt exhibits increased pain with moving sit to supine to sit and moving across plinth. Pt reported tightness to almost pain in L hamstring stretch.  Pt performed standing and tall kneeling therapeutic exercise however reported pain increased to 7/10.  Pt reported multifidus walk increases pain the most.   Pt reported no increased pain with release of traction and pain was 5-4/10 after traction.        Visit Diagnoses:    ICD-10-CM ICD-9-CM   1. Lumbar pain  M54.50 724.2       Progress per Plan of Care and Progress strengthening /stabilization /functional activity           Timed:  Manual Therapy:         mins  99710;  Therapeutic Exercise:    20     mins  41809;     Neuromuscular Svetlana:        mins  65135;    Therapeutic Activity:          mins  93950;     Gait Training:           mins  81436;     Ultrasound:          mins  45363;    Electrical Stimulation:         mins  74865 ( );  Aquatic Therapy          mins  02686    Untimed:  Electrical Stimulation:         mins  43832 ( );  Mechanical Traction:   12      mins  77748;     Timed Treatment:   20   mins   Total Treatment:     32   mins  Liseth Spears PTA  Physical Therapist

## 2021-10-06 ENCOUNTER — TREATMENT (OUTPATIENT)
Dept: PHYSICAL THERAPY | Facility: CLINIC | Age: 31
End: 2021-10-06

## 2021-10-06 DIAGNOSIS — M54.50 LUMBAR PAIN: Primary | ICD-10-CM

## 2021-10-06 PROCEDURE — 97012 MECHANICAL TRACTION THERAPY: CPT | Performed by: PHYSICAL THERAPIST

## 2021-10-06 PROCEDURE — 97110 THERAPEUTIC EXERCISES: CPT | Performed by: PHYSICAL THERAPIST

## 2021-10-06 NOTE — PROGRESS NOTES
Physical Therapy Daily Treatment Note      Patient: Javier Arriola   : 1990  Referring practitioner: Sebastien Lzoada MD  Date of Initial Visit: Type: THERAPY  Noted: 2021  Today's Date: 10/6/2021  Patient seen for 9 sessions           Subjective Questionnaire:       Subjective Evaluation    History of Present Illness    Subjective comment: Pt reported 5/10 back pain today.Pain  Current pain ratin           Objective   See Exercise, Manual, and Modality Logs for complete treatment.       Assessment & Plan     Assessment  Assessment details: Pt  Reported after traction he always feels better a day or two after and always comes back.  Pt reports that feels better means he can move better and has less pain.  Pt reported 5/10 after traction.        Visit Diagnoses:    ICD-10-CM ICD-9-CM   1. Lumbar pain  M54.50 724.2       Progress per Plan of Care and Progress strengthening /stabilization /functional activity           Timed:  Manual Therapy:    3     mins  64235;  Therapeutic Exercise:    22     mins  08441;     Neuromuscular Svetlana:        mins  56338;    Therapeutic Activity:          mins  84229;     Gait Training:           mins  32805;     Ultrasound:          mins  41434;    Electrical Stimulation:         mins  80865 ( );  Aquatic Therapy          mins  06581    Untimed:  Electrical Stimulation:         mins  89745 ( );  Mechanical Traction:    15     mins  15805;     Timed Treatment:   25   mins   Total Treatment:     40   mins  Liseth Spears PTA  Physical Therapist

## 2021-10-13 ENCOUNTER — TELEPHONE (OUTPATIENT)
Dept: PHYSICAL THERAPY | Facility: CLINIC | Age: 31
End: 2021-10-13

## 2021-10-19 ENCOUNTER — OFFICE VISIT (OUTPATIENT)
Dept: ORTHOPEDIC SURGERY | Facility: CLINIC | Age: 31
End: 2021-10-19

## 2021-10-19 VITALS — WEIGHT: 229 LBS | TEMPERATURE: 97.4 F | HEIGHT: 76 IN | BODY MASS INDEX: 27.89 KG/M2

## 2021-10-19 DIAGNOSIS — M51.26 HERNIATED LUMBAR INTERVERTEBRAL DISC: Primary | ICD-10-CM

## 2021-10-19 PROCEDURE — 99213 OFFICE O/P EST LOW 20 MIN: CPT | Performed by: ORTHOPAEDIC SURGERY

## 2021-10-19 NOTE — PROGRESS NOTES
He fails to improve with conservative care.  He has an appointment with pain management next week.  He does have positive straight leg raise test on the left and a small herniated disc of 5 1 on the left noted on the MRI at [facility].  I will keep him off work until he gets the injection.  Hopefully that will work follow-up in a month

## 2021-10-28 ENCOUNTER — PREP FOR SURGERY (OUTPATIENT)
Dept: SURGERY | Facility: SURGERY CENTER | Age: 31
End: 2021-10-28

## 2021-10-28 ENCOUNTER — OFFICE VISIT (OUTPATIENT)
Dept: PAIN MEDICINE | Facility: CLINIC | Age: 31
End: 2021-10-28

## 2021-10-28 VITALS
TEMPERATURE: 97.2 F | HEIGHT: 76 IN | SYSTOLIC BLOOD PRESSURE: 145 MMHG | OXYGEN SATURATION: 99 % | BODY MASS INDEX: 28.33 KG/M2 | HEART RATE: 78 BPM | DIASTOLIC BLOOD PRESSURE: 100 MMHG | WEIGHT: 232.6 LBS

## 2021-10-28 DIAGNOSIS — M51.26 DISPLACEMENT OF LUMBAR INTERVERTEBRAL DISC WITHOUT MYELOPATHY: Primary | ICD-10-CM

## 2021-10-28 DIAGNOSIS — M54.16 LUMBAR RADICULOPATHY: ICD-10-CM

## 2021-10-28 DIAGNOSIS — M51.26 DISPLACEMENT OF LUMBAR INTERVERTEBRAL DISC WITHOUT MYELOPATHY: ICD-10-CM

## 2021-10-28 DIAGNOSIS — M54.16 LUMBAR RADICULOPATHY: Primary | ICD-10-CM

## 2021-10-28 LAB
POC AMPHETAMINES: NEGATIVE
POC BARBITURATES: NEGATIVE
POC BENZODIAZEPHINES: NEGATIVE
POC COCAINE: NEGATIVE
POC METHADONE: NEGATIVE
POC METHAMPHETAMINE SCREEN URINE: NEGATIVE
POC OPIATES: POSITIVE
POC OXYCODONE: NEGATIVE
POC PHENCYCLIDINE: NEGATIVE
POC PROPOXYPHENE: NEGATIVE
POC THC: NEGATIVE
POC TRICYCLIC ANTIDEPRESSANTS: NEGATIVE

## 2021-10-28 PROCEDURE — 80305 DRUG TEST PRSMV DIR OPT OBS: CPT | Performed by: ANESTHESIOLOGY

## 2021-10-28 PROCEDURE — 99204 OFFICE O/P NEW MOD 45 MIN: CPT | Performed by: ANESTHESIOLOGY

## 2021-10-28 RX ORDER — SODIUM CHLORIDE 0.9 % (FLUSH) 0.9 %
10 SYRINGE (ML) INJECTION AS NEEDED
Status: CANCELLED | OUTPATIENT
Start: 2021-10-28

## 2021-10-28 RX ORDER — METHIMAZOLE 10 MG/1
10 TABLET ORAL 2 TIMES DAILY
COMMUNITY
Start: 2021-10-14

## 2021-10-28 RX ORDER — HYDROCODONE BITARTRATE AND ACETAMINOPHEN 5; 325 MG/1; MG/1
1 TABLET ORAL EVERY 6 HOURS PRN
COMMUNITY
Start: 2021-10-18 | End: 2021-12-15

## 2021-10-28 RX ORDER — GABAPENTIN 300 MG/1
300 CAPSULE ORAL 2 TIMES DAILY
Qty: 60 CAPSULE | Refills: 1 | Status: SHIPPED | OUTPATIENT
Start: 2021-10-28 | End: 2021-12-08

## 2021-10-28 RX ORDER — GABAPENTIN 300 MG/1
300 CAPSULE ORAL 2 TIMES DAILY
COMMUNITY
Start: 2021-09-30 | End: 2021-10-28 | Stop reason: SDUPTHER

## 2021-10-28 RX ORDER — CLINDAMYCIN HYDROCHLORIDE 150 MG/1
150 CAPSULE ORAL EVERY 6 HOURS PRN
COMMUNITY
Start: 2021-10-18 | End: 2021-12-15

## 2021-10-28 RX ORDER — SODIUM CHLORIDE 0.9 % (FLUSH) 0.9 %
10 SYRINGE (ML) INJECTION EVERY 12 HOURS SCHEDULED
Status: CANCELLED | OUTPATIENT
Start: 2021-10-28

## 2021-10-28 NOTE — PROGRESS NOTES
"The patient has a pain history of the following:  Lumbar pain  Lumbar disc displacement  Lumbar spondylolisthesis     Previous interventions that the patient has received include:   None    Pain medications include:  Ibuprofen  Gabapentin     Other conservative modalities which the patient reports using include:  Physical Therapy: yes  Chiropractor: yes - years ago   Massage Therapy: no  TENS: yes  Neck or back surgery: no  Past pain management: no  Heat  Ice    Past Significant Surgical History:  None    HPI:       CHIEF COMPLAINT: Back Pain      Javier Arriola is a 31 y.o. male referred here by Sebastien Lozada MD. Javier Arriola presents to the office for evaluation and treatment of Back Pain      Onset:  March 2021  Inciting Event:  Loading pallets   Location:  Low back  Pain: Pain described as ache, sharp and \"searing\", grinding, tingling. Located in the low back and does radiate into the left lower extremity to the foot - he feels like it's the entire leg.  Severity:  Pain rated as a 8 /10.  Apportions pain as 90%  back pain and 10% extremity pain.  Symptoms have been constant.  Exacerbation:  Bending, lifting, moving up and down, walking, stretching.   Alleviation:  Sitting still/resting.  Associated Symptoms:   He denies any new onset of bowel or bladder weakness, significant leg weakness or saddle anesthesia. Denies balance problems or lower extremity incoordination.  Ambulates: Without assistive device     He's currently on an antibiotic for a pulled tooth.  He states he's had lots of tooth problems this year.  He's a  (currently off work due to his injury) and states he doesn't take care of his teeth like he should.  He's had multiple prescriptions this year for pain medications and has 1 prescription last month for gabapentin to help with his radicular pain.  His primary care told him she would no longer prescribe the gabapentin since he was coming to a pain management doctor.  He was " taking the gabapentin daily without side effects and felt that it was helping in treating his pain.  He denies using any recreational drugs (including THC) or medications not prescribed to him and states that he's never had a drug or alcohol problem in the past.        PEG Assessment   What number best describes your pain on average in the past week?7  What number best describes how, during the past week, pain has interfered with your enjoyment of life?8  What number best describes how, during the past week, pain has interfered with your general activity?  8        Current Outpatient Medications:   •  HYDROcodone-acetaminophen (NORCO) 5-325 MG per tablet, Take 1 tablet by mouth Every 6 (Six) Hours As Needed., Disp: , Rfl:   •  ibuprofen (ADVIL,MOTRIN) 800 MG tablet, Take 800 mg by mouth Every 8 (Eight) Hours., Disp: , Rfl:   •  methIMAzole (TAPAZOLE) 10 MG tablet, Take 10 mg by mouth 2 (Two) Times a Day., Disp: , Rfl:   •  propranolol (INDERAL) 40 MG tablet, , Disp: , Rfl:   •  clindamycin (CLEOCIN) 150 MG capsule, Take 150 mg by mouth Every 6 (Six) Hours As Needed., Disp: , Rfl:   •  gabapentin (NEURONTIN) 300 MG capsule, Take 1 capsule by mouth 2 (Two) Times a Day., Disp: 60 capsule, Rfl: 1    The following portions of the patient's history were reviewed and updated as appropriate: allergies, current medications, past family history, past medical history, past social history, past surgical history and problem list.      REVIEW OF PERTINENT MEDICAL DATA    MR lumbar spine wo IV contrast      Narrative    Name: JOSE DAN   MRN: [id number]   FIN: [id number]   Accession No: [id number]   Sex: Male   : 1990     Age: 30 years   Study Date/Time: 2021 7:00 AM   Study Description: MRI Spine Lumbar WO   Attending Physician: BILL MONTAGUE   Ordering Physician: BILL MONTAGUE   Referring Physician: BILL MONTAGUE   Primary Care Physician: BILL  JETT MONTAGUE        [id number]     INDICATION:     Low back pain for 3 weeks. Lifting injury. Numbness in the right lower extremity     TECHNIQUE:   MRI of the lumbar spine without contrast.     COMPARISON:     Lumbar spine radiographs 3/22/2021     FINDINGS:   Vertebral body height and alignment is within normal limits. There is no abnormal bone   marrow signal. Signal characteristics of the distal thoracic spinal cord and conus   medullaris are within normal limits. The conus terminates at the L1-L2 level.     The L1-L2, L2-L3, L3-L4, and L4-L5 discs are normal. There is no disc protrusion. No   central canal or neural foraminal stenosis. Mild facet arthropathy is noted at L4-L5.     L5-S1: The S1 vertebra is a transitional vertebra. A small disc protrusion is noted   centrally at L5-S1. There is mild facet arthropathy. No central canal stenosis. There   is mild bilateral neural foraminal stenosis. A small annular tear is associated with   the left neural foraminal margin of the disc protrusion.     IMPRESSION:     1. Small disc protrusion at L5-S1 with a annular tear along the left neural foraminal   component of the disc protrusion. There is mild facet arthropathy. No central canal   stenosis, however there is mild bilateral neural foraminal stenosis.       Dictated by:Yulissa Jacobs MD   Dictated DT/TM:  2021 8:27 AM   Signed by:  Yulissa Jacobs MD   Signed DT/TM:  2021 8:31 AM   : CRAIG/JO:   ##### Final #####     Dictated by:    YULISSA JACOBS MD-RAD   Dictated DT/TM: 2021 8:27 am   Interpreted and electronically signed by:  YULISSA JACOBS MD-RAD   Signed DT/TM:  2021 8:31 am    MR thoracic spine wo IV contrast  Specimen:  Specimen of unknown material (specimen) - Unknown    Narrative    Name: JOSE DAN   MRN: [id number]   FIN: [id number]   Accession No: [id number]   Sex: Male   : 1990     Age: 30 years   Study Date/Time: 2021 11:00 AM   Study  Description: MRI Spine Thoracic WO   Attending Physician: SYED LOPEZ   Ordering Physician: SYED LOPEZ   Referring Physician: SYED LOPEZ   Primary Care Physician: SYED LOPEZ       INDICATION:     Mid to lower back pain with tingling to the right. Symptoms since trauma on 3/17/2021.   No reported history of malignancy.     TECHNIQUE:   MRI of the thoracic spine without contrast.     COMPARISON:  Thoracic spine radiographs dated 4/22/2021.     FINDINGS: T12 is not imaged in its entirety on the current exam.     No acute fracture or traumatic listhesis. Vertebral body heights are within normal   limits. Marrow signal is also within normal limits. No infiltrating marrow lesions.     Imaged cord is within normal limits for signal and caliber. No cord compression or   edema. No myelomalacia. No intramedullary or extramedullary masses. Small Schmorl's   node along the superior endplate of T10 without significant surrounding marrow edema   suggests that this is chronic. Otherwise, no significant degenerative disc disease. No   disc bulge. No high-grade central canal or neural foraminal narrowing.     Prevertebral posterior paravertebral soft tissues are within normal limits.     IMPRESSION:   1.  No acute fracture or traumatic listhesis.   2.  Chronic findings as above.         Dictated by:Steph Heller   Dictated DT/TM:  4/22/2021 4:21 PM   Signed by:  Steph Heller   Signed DT/TM:  4/22/2021 4:35 PM   : MAGALI:   ##### Final #####     Dictated by:    PRIYA HELLER MD-RAD   Dictated DT/TM: 04/22/2021 4:21 pm   Interpreted and electronically signed by:  PRIYA HELLER MD-RAD   Signed DT/TM:  04/22/2021 4:35 pm      7/10/21 Creatinine 0.91, Platelets 186 (10*3)    Review of Systems   Constitutional: Positive for activity change (decreased) and fatigue. Negative for chills and fever.   HENT: Negative for congestion.    Eyes: Negative for visual disturbance.  "  Respiratory: Negative for chest tightness and shortness of breath.    Cardiovascular: Negative for chest pain.   Gastrointestinal: Negative for anal bleeding, constipation and diarrhea.   Genitourinary: Negative for difficulty urinating and dysuria.   Musculoskeletal: Positive for back pain.   Neurological: Positive for numbness (left leg). Negative for dizziness, weakness, light-headedness and headaches.   Psychiatric/Behavioral: Positive for agitation (r/t pain) and sleep disturbance. Negative for self-injury and suicidal ideas. The patient is not nervous/anxious.      I have reviewed and confirmed the accuracy of the ROS as documented by the MA/LPN/RN Tari Soriano MD      Vitals:    10/28/21 1518   BP: 145/100   Pulse: 78   Temp: 97.2 °F (36.2 °C)   SpO2: 99%   Weight: 106 kg (232 lb 9.6 oz)   Height: 193 cm (76\")   PainSc:   8   PainLoc: Back         Objective   Physical Exam  Vitals reviewed.   Constitutional:       General: He is not in acute distress.  Pulmonary:      Effort: Pulmonary effort is normal. No respiratory distress.   Musculoskeletal:      Comments: Ambulation: Without assistive device, antalgic and slow  Lumbar Exam:  Appearance: Scoliotic curve absent and scarring absent  Able to tandem, toe, and heel walk: Yes with difficulty  Palpated over lumbosacral paravertebral regions and transverse processes with negative tenderness appreciated, Bilateral.   Sacroiliac joints are not tender, Bilateral.  Trochanteric bursa are not tender, Bilateral.  Straight leg raise is positive radiculopathy, Left.  Slump test is negative  radiculopathy, Right.  Facet loading is positive for pain, Left.  Paraspinal/adjacent lumbar musculature are tender to palpation, Left.   Skin:     General: Skin is warm and dry.      Comments: Tattoos on bilateral upper extremities   Neurological:      General: No focal deficit present.      Mental Status: He is alert.   Psychiatric:         Mood and Affect: Mood normal.        "  Thought Content: Thought content normal.         Assessment/Plan   Diagnoses and all orders for this visit:    1. Displacement of lumbar intervertebral disc without myelopathy (Primary)  -     gabapentin (NEURONTIN) 300 MG capsule; Take 1 capsule by mouth 2 (Two) Times a Day.  Dispense: 60 capsule; Refill: 1  -     Case Request  -     POC Urine Drug Screen, Triage  -     Urine Drug Screen Confirmation - Urine, Clean Catch; Future    2. Lumbar radiculopathy  -     gabapentin (NEURONTIN) 300 MG capsule; Take 1 capsule by mouth 2 (Two) Times a Day.  Dispense: 60 capsule; Refill: 1  -     Case Request  -     POC Urine Drug Screen, Triage  -     Urine Drug Screen Confirmation - Urine, Clean Catch; Future        - Baseline urine drug screen was obtained.  Routine UDS in office today as part of monitoring requirements for controlled substances.  The specimen was viewed and the immunoassay result reviewed and is positive for opi.  This specimen will be sent to [facility] laboratory for confirmation.      - Pertinent labs reviewed.   - Pertinent imaging reviewed.   - Gabapentin prescribed 300mg BID.  Discussed medication with the patient.  Included in this discussion was the potential for side effects and adverse events.  Patient verbalized understanding and wished to proceed.  Prescription will be sent to pharmacy.   - Will schedule for L5-S1 Epidural steroid injection (left of midline if possible).  Risks discussed including but not limited to bleeding, bruising, infection, damage to surrounding structures, headache, and rare things such as being paralyzed, seizure, stroke, heart attack and death.  The risk of steroid medications include but are not limited to immunosuppression, which can increase the risk of flynn an infectious disease as well as decrease the immune response to a vaccine.    - Javier Arriola reports a pain score of 8.  Given his pain assessment as noted, treatment options were discussed and the  following options were decided upon as a follow-up plan to address the patient's pain: prescription for non-opiod analgesics and steroid injections.    --- Follow-up 1 month office visit and for L5-S1 Epidural steroid injection (left of midline if possible).            HERBERT REPORT    As part of the patient's treatment plan, I am prescribing controlled substances. The patient has been made aware of appropriate use of such medications, including potential risk of somnolence, limited ability to drive and/or work safely, and the potential for dependence or overdose. It has also bee made clear that these medications are for use by this patient only, without concomitant use of alcohol or other substances unless prescribed.     As the clinician, I personally reviewed the HERBERT from 10/28/21 while the patient was in the office today.    History and physical exam exhibit continued safe and appropriate use of controlled substances.         While examining this patient, I wore protective equipment including a mask, eye shield and gloves.  I washed my hands before and after this patient encounter.  The patient wore a mask throughout the visit as well.     Tari Soriano MD  Pain Management

## 2021-10-28 NOTE — PATIENT INSTRUCTIONS
What to expect if we're setting up an injection/procedure    - I have placed the order today, we'll start speaking to your insurance for authorization (this can sometimes take a few weeks).   - You should be scheduled for your procedure before you leave the office.  If you were not, please call our office to schedule.   - A COVID test may be required for your procedure.  We will let you know if this needs to be scheduled.  - LIGHT Intravenous (IV) sedation is offered for some procedures: you will NOT be put to sleep.  If you plan to have sedation, do not eat or drink anything on the day of your injection.   - Most procedures require having someone drive you.  Please make sure you arrange a  unless told otherwise.   - If you take a blood thinner and you were not instructed whether to continue or hold it, please contact us with any questions.       Epidural Steroid Injection    An epidural steroid injection is a shot of steroid medicine and numbing medicine that is given into the space between the spinal cord and the bones of the back (epidural space). The shot helps relieve pain caused by an irritated or swollen nerve root.  The amount of pain relief you get from the injection depends on what is causing the nerve to be swollen and irritated, and how long your pain lasts. You are more likely to benefit from this injection if your pain is strong and comes on suddenly rather than if you have had long-term (chronic) pain.  Tell a health care provider about:  · Any allergies you have.  · All medicines you are taking, including vitamins, herbs, eye drops, creams, and over-the-counter medicines.  · Any problems you or family members have had with anesthetic medicines.  · Any blood disorders you have.  · Any surgeries you have had.  · Any medical conditions you have.  · Whether you are pregnant or may be pregnant.  What are the risks?  Generally, this is a safe procedure. However, problems may occur,  including:  · Headache.  · Bleeding.  · Infection.  · Allergic reaction to medicines.  · Nerve damage.  What happens before the procedure?  Staying hydrated  Follow instructions from your health care provider about hydration, which may include:  · Up to 2 hours before the procedure - you may continue to drink clear liquids, such as water, clear fruit juice, black coffee, and plain tea.  Eating and drinking restrictions  Follow instructions from your health care provider about eating and drinking, which may include:  · 8 hours before the procedure - stop eating heavy meals or foods, such as meat, fried foods, or fatty foods.  · 6 hours before the procedure - stop eating light meals or foods, such as toast or cereal.  · 6 hours before the procedure - stop drinking milk or drinks that contain milk.  · 2 hours before the procedure - stop drinking clear liquids.  Medicines  · You may be given medicines to lower anxiety.  · Ask your health care provider about:  ? Changing or stopping your regular medicines. This is especially important if you are taking diabetes medicines or blood thinners.  ? Taking medicines such as aspirin and ibuprofen. These medicines can thin your blood. Do not take these medicines unless your health care provider tells you to take them.  ? Taking over-the-counter medicines, vitamins, herbs, and supplements.  General instructions  · Ask your health care provider what steps will be taken to prevent infection.  · Plan to have a responsible adult take you home from the hospital or clinic.  · If you will be going home right after the procedure, plan to have a responsible adult care for you for the time you are told. This is important.  What happens during the procedure?  · An IV will be inserted into one of your veins.  · You will be given one or more of the following:  ? A medicine to help you relax (sedative).  ? A medicine to numb the area (local anesthetic).  · You will be asked to lie on your  abdomen or sit.  · The injection site will be cleaned.  · A needle will be inserted through your skin into the epidural space. This may cause you some discomfort. An X-ray machine will be used to guide the needle as close as possible to the affected nerve.  · A steroid medicine and a local anesthetic will be injected into the epidural space.  · The needle and IV will be removed.  · A bandage (dressing) will be put over the injection site.  The procedure may vary among health care providers and hospitals.  What can I expect after the procedure?  · Your blood pressure, heart rate, breathing rate, and blood oxygen level will be monitored until you leave the hospital or clinic.  · Your arm or leg may feel weak or numb for a few hours.  · The injection site may feel sore.  Follow these instructions at home:  Injection site care  · You may remove the bandage (dressing) after 24 hours.  · Check your injection site every day for signs of infection. Check for:  ? Redness, swelling, or pain.  ? Fluid or blood.  ? Warmth.  ? Pus or a bad smell.  Managing pain, stiffness, and swelling  · For 24 hours after the procedure:  ? Avoid using heat on the injection site.  ? Do not take baths, swim, or use a hot tub until your health care provider approves. Ask your health care provider if you may take showers. You may only be allowed to take sponge baths.  · If directed, put ice on the injection site. To do this:  ? Put ice in a plastic bag.  ? Place a towel between your skin and the bag.  ? Leave the ice on for 20 minutes, 2-3 times a day.    Activity  · If you were given a sedative during the procedure, it can affect you for several hours. Do not drive or operate machinery until your health care provider says that it is safe.  · Return to your normal activities as told by your health care provider. Ask your health care provider what activities are safe for you.  General instructions  · Take over-the-counter and prescription medicines  only as told by your health care provider.  · Drink enough fluid to keep your urine pale yellow.  · Keep all follow-up visits as told by your health care provider. This is important.  Contact a health care provider if:  · You have any of these signs of infection:  ? Redness, swelling, or pain around your injection site.  ? Fluid or blood coming from your injection site.  ? Warmth coming from your injection site.  ? Pus or a bad smell coming from your injection site.  ? A fever.  · You continue to have pain and soreness around the injection site, even after taking over-the-counter pain medicine.  · You have severe, sudden, or lasting nausea or vomiting.  Get help right away if:  · You have severe pain at the injection site that is not relieved by medicines.  · You develop a severe headache or a stiff neck.  · You become sensitive to light.  · You have any new numbness or weakness in your legs or arms.  · You lose control of your bladder or bowel movements.  · You have trouble breathing.  Summary  · An epidural steroid injection is a shot of steroid medicine and numbing medicine that is given into the epidural space.  · The shot helps relieve pain caused by an irritated or swollen nerve root.  · You are more likely to benefit from this injection if your pain is strong and comes on suddenly rather than if you have had chronic pain.  This information is not intended to replace advice given to you by your health care provider. Make sure you discuss any questions you have with your health care provider.  Document Revised: 04/16/2021 Document Reviewed: 06/29/2020  [employer] Patient Education © 2021 [employer]

## 2021-11-01 ENCOUNTER — TRANSCRIBE ORDERS (OUTPATIENT)
Dept: SURGERY | Facility: SURGERY CENTER | Age: 31
End: 2021-11-01

## 2021-11-01 DIAGNOSIS — Z41.9 SURGERY, ELECTIVE: Primary | ICD-10-CM

## 2021-11-01 PROBLEM — M54.16 LUMBAR RADICULOPATHY: Status: ACTIVE | Noted: 2021-11-01

## 2021-11-01 PROBLEM — M51.26 DISPLACEMENT OF LUMBAR INTERVERTEBRAL DISC WITHOUT MYELOPATHY: Status: ACTIVE | Noted: 2021-11-01

## 2021-11-08 NOTE — SIGNIFICANT NOTE
Patient educated on the following :      -You will need to- If you are receiving Sedation for your procedure Nothing to Eat 6 hours and only clear liquids for 2 hours prior to your procedure.    - The date of your procedure, your are welcome to have one visitor at bedside or remain within 10-15 minutes of [facility]  -You will need to arrive at 1245 on 11/10 PROCEDURE  -Please contact [facility] at: [phone] with any questions and/or concerns

## 2021-11-09 NOTE — DISCHARGE INSTRUCTIONS
[facility] - Post-procedure Instructions          --  While there are no absolute restrictions, it is recommended that you do not perform strenuous activity today. In the morning, you may resume your level of activity as before your block.    --  If you have a band-aid at your injection site, please remove it later today. Observe the area for any redness, swelling, pus-like drainage, or a temperature over 101°. If any of these symptoms occur, please call your doctor at [phone]. If after office hours, leave a message and the on-call provider will return your call.    --  Ice may be applied to your injection site. It is recommended you avoid direct heat (heating pad; hot tub) for 1-2 days.    --  Call [facility] at [phone] if you experience persistent headache, persistent bleeding from the injection site, or severe pain not relieved by heat or oral medication.    --  Do not make important decisions today.    --  Due to the effects of the block and/or the I.V. Sedation, DO NOT drive or operate hazardous machinery for 12 hours.  Local anesthetics may cause numbness after procedure and precautions must be taken with regards to operating equipment as well as with walking, even if ambulating with assistance of another person or with an assistive device.    --  Do not drink alcohol for 12 hours.    -- You may return to work tomorrow, or as directed by your referring doctor.    --  Occasionally you may notice a slight increase in your pain after the procedure. This should start to improve within the next 24-48 hours. Radiofrequency ablation procedure pain may last 3-4 weeks.    --  It may take as long as 3-4 days before you notice a gradual improvement in your pain and/or other symptoms.    -- You may continue to take your prescribed pain medication as needed.    --  Some normal possible side effects of steroid use could include fluid retention, increased blood sugar, dull headache,  increased sweating, increased appetite, mood swings and flushing.    --  Diabetics are recommended to watch their blood glucose level closely for 24-48 hours after the injection.    --  Must stay in PACU for 20 min upon arrival and prove no leg weakness before being discharged.    --  IN THE EVENT OF A LIFE THREATENING EMERGENCY, (CHEST PAIN, BREATHING DIFFICULTIES, PARALYSIS…) YOU SHOULD GO TO YOUR NEAREST EMERGENCY ROOM.    --  You should be contacted by our office within 2-3 days to schedule follow up or next appointment date.  If not contacted within 7 days, please call the office at [phone]

## 2021-11-10 ENCOUNTER — HOSPITAL ENCOUNTER (OUTPATIENT)
Facility: SURGERY CENTER | Age: 31
Setting detail: HOSPITAL OUTPATIENT SURGERY
Discharge: HOME | End: 2021-11-10
Attending: ANESTHESIOLOGY | Admitting: ANESTHESIOLOGY

## 2021-11-10 ENCOUNTER — HOSPITAL ENCOUNTER (OUTPATIENT)
Dept: GENERAL RADIOLOGY | Facility: SURGERY CENTER | Age: 31
End: 2021-11-10

## 2021-11-10 VITALS
SYSTOLIC BLOOD PRESSURE: 139 MMHG | HEART RATE: 67 BPM | TEMPERATURE: 97.8 F | WEIGHT: 235 LBS | HEIGHT: 76 IN | BODY MASS INDEX: 28.62 KG/M2 | RESPIRATION RATE: 18 BRPM | OXYGEN SATURATION: 100 % | DIASTOLIC BLOOD PRESSURE: 89 MMHG

## 2021-11-10 DIAGNOSIS — M51.26 DISPLACEMENT OF LUMBAR INTERVERTEBRAL DISC WITHOUT MYELOPATHY: ICD-10-CM

## 2021-11-10 DIAGNOSIS — M54.16 LUMBAR RADICULOPATHY: ICD-10-CM

## 2021-11-10 DIAGNOSIS — Z41.9 SURGERY, ELECTIVE: ICD-10-CM

## 2021-11-10 PROCEDURE — 0 IOHEXOL 300 MG/ML SOLUTION 10 ML VIAL: Performed by: ANESTHESIOLOGY

## 2021-11-10 PROCEDURE — 76000 FLUOROSCOPY <1 HR PHYS/QHP: CPT

## 2021-11-10 PROCEDURE — 3E0S3BZ INTRODUCTION OF ANESTHETIC AGENT INTO EPIDURAL SPACE, PERCUTANEOUS APPROACH: ICD-10-PCS | Performed by: ANESTHESIOLOGY

## 2021-11-10 PROCEDURE — 62323 NJX INTERLAMINAR LMBR/SAC: CPT | Performed by: ANESTHESIOLOGY

## 2021-11-10 PROCEDURE — 77002 NEEDLE LOCALIZATION BY XRAY: CPT

## 2021-11-10 RX ORDER — SODIUM CHLORIDE 0.9 % (FLUSH) 0.9 %
10 SYRINGE (ML) INJECTION EVERY 12 HOURS SCHEDULED
Status: DISCONTINUED | OUTPATIENT
Start: 2021-11-10 | End: 2021-11-10 | Stop reason: HOSPADM

## 2021-11-10 RX ORDER — SODIUM CHLORIDE 0.9 % (FLUSH) 0.9 %
10 SYRINGE (ML) INJECTION AS NEEDED
Status: DISCONTINUED | OUTPATIENT
Start: 2021-11-10 | End: 2021-11-10 | Stop reason: HOSPADM

## 2021-11-10 NOTE — OP NOTE
L5/S1 Interlaminar Lumbar Epidural Steroid Injection   [facility]    PREOPERATIVE DIAGNOSIS:   Lumbar Disc Displacement and Lumbar Radiculopathy  POSTOPERATIVE DIAGNOSIS:  Same as preop diagnosis    PROCEDURE:   Lumbar Epidural Steroid Injection, Therapeutic Interlaminar Injection, with epidurogram, at  L5/S1 level    PRE-PROCEDURE DISCUSSION WITH PATIENT:    Risks and complications were discussed with the patient prior to starting the procedure and informed consent was obtained.  We discussed various topics including but not limited to bleeding, infection, injury, paralysis, nerve injury, dural puncture, coma, death, worsening of clinical picture, lack of pain relief, and postprocedural soreness.    SURGEON:  Tari Soriano MD    REASON FOR PROCEDURE:    Diagnostic injection at this level is needed    SEDATION:  Patient declined administration of moderate sedation    ANESTHETIC:  Marcaine 0.5%  STEROID:   15mg dexamethasone    DESCRIPTON OF PROCEDURE:    After obtaining informed consent, I.V. was not started in the preop area.   The patient was taken to the operating room and placed in the prone position.  EKG, blood pressure, and pulse oximeter were monitored throughout, and sedation was provided as needed by the RN under my guidance. All pressure points were well padded.  The lumbar spine area was prepped with Chloraprep and draped in a sterile fashion.      AP fluoroscopic image was used to visualize the L5/S1 interspace.  The skin and subcutaneous tissue over the area was anesthetized with 1% Lidocaine.  An 18-Gauge Tuohy needle was then advanced through the anesthetized skin tract under fluoroscopic guidance in a coaxial view using a loss of resistance technique.  Lateral fluoroscopy was used to verify appropriate needle depth.  Once the needle tip was felt to be in the posterior epidural space, aspiration was noted to be negative for blood or CSF.  A volume of 1mL of Omnipaque 180 was  then injected under live fluoroscopy in an AP view which produced good epidural spread with no evidence of loculation, vascular run-off or intrathecal spread.  Subsequently, a total volume of 5mL consisting of 15mg of dexamethasone, 0.5mL of 0.5% bupivcacaine and normal saline was injected without resistance.  The needle was removed intact.     ESTIMATED BLOOD LOSS:  <5 mL  SPECIMENS:  None    COMPLICATIONS:     No complications were noted., There was no indication of vascular uptake on live injection of contrast dye. and There was no indication of intrathecal uptake on live injection of contrast dye.    TOLERANCE & DISCHARGE CONDITION:    The patient tolerated the procedure well.  The patient was transported to the recovery area without difficulties.  The patient was discharged to home under the care of family in stable and satisfactory condition.    PLAN OF CARE:  1. The patient was given our standard instruction sheet.  2. The patient will Return to clinic 4-6 wks  3. The patient will resume all medications as per the medication reconciliation sheet.

## 2021-11-15 ENCOUNTER — TELEPHONE (OUTPATIENT)
Dept: PAIN MEDICINE | Facility: CLINIC | Age: 31
End: 2021-11-15

## 2021-11-15 NOTE — TELEPHONE ENCOUNTER
Caller: MARCELA KELLEY    Relationship to patient: FARA RESENDEZ  - [employer]    Best call back number:     Patient is needing: [facility]

## 2021-11-16 NOTE — TELEPHONE ENCOUNTER
My standard instructions include returning to work the day following an epidural.  If he was taken off work prior to the epidural, I recommend he seek guidance from the provider who took him off work.

## 2021-11-16 NOTE — TELEPHONE ENCOUNTER
Would you like to advise pt on when to return to work following procedure? Please advise. Thank you.

## 2021-12-01 ENCOUNTER — DOCUMENTATION (OUTPATIENT)
Dept: PHYSICAL THERAPY | Facility: CLINIC | Age: 31
End: 2021-12-01

## 2021-12-08 ENCOUNTER — OFFICE VISIT (OUTPATIENT)
Dept: PAIN MEDICINE | Facility: CLINIC | Age: 31
End: 2021-12-08

## 2021-12-08 ENCOUNTER — PREP FOR SURGERY (OUTPATIENT)
Dept: SURGERY | Facility: SURGERY CENTER | Age: 31
End: 2021-12-08

## 2021-12-08 VITALS
SYSTOLIC BLOOD PRESSURE: 124 MMHG | HEART RATE: 102 BPM | BODY MASS INDEX: 28.64 KG/M2 | DIASTOLIC BLOOD PRESSURE: 84 MMHG | WEIGHT: 235.2 LBS | RESPIRATION RATE: 16 BRPM | TEMPERATURE: 97.1 F | HEIGHT: 76 IN | OXYGEN SATURATION: 100 %

## 2021-12-08 DIAGNOSIS — M51.26 HERNIATED LUMBAR INTERVERTEBRAL DISC: ICD-10-CM

## 2021-12-08 DIAGNOSIS — M51.26 DISPLACEMENT OF LUMBAR INTERVERTEBRAL DISC WITHOUT MYELOPATHY: ICD-10-CM

## 2021-12-08 DIAGNOSIS — M54.16 LUMBAR RADICULOPATHY: Primary | ICD-10-CM

## 2021-12-08 PROCEDURE — 99214 OFFICE O/P EST MOD 30 MIN: CPT | Performed by: NURSE PRACTITIONER

## 2021-12-08 RX ORDER — SODIUM CHLORIDE 0.9 % (FLUSH) 0.9 %
10 SYRINGE (ML) INJECTION AS NEEDED
Status: CANCELLED | OUTPATIENT
Start: 2021-12-08

## 2021-12-08 RX ORDER — SODIUM CHLORIDE 0.9 % (FLUSH) 0.9 %
10 SYRINGE (ML) INJECTION EVERY 12 HOURS SCHEDULED
Status: CANCELLED | OUTPATIENT
Start: 2021-12-08

## 2021-12-08 RX ORDER — GABAPENTIN 300 MG/1
300 CAPSULE ORAL 3 TIMES DAILY
Qty: 90 CAPSULE | Refills: 0 | Status: SHIPPED | OUTPATIENT
Start: 2021-12-08 | End: 2021-12-28

## 2021-12-08 NOTE — PROGRESS NOTES
CHIEF COMPLAINT  F/U procedure,LUMBAR EPIDURAL 1ST VISIT lumbar 5-sacral 1.       Subjective   Javier Arriola is a 31 y.o. male  who presents to the office for follow-up of procedure.  He completed a LESI at L5/S1   on  11/10/2021 performed by Dr. Soriano for management of back pain. Patient reports 80% relief from the procedure x 2 weeks and then his pain returned to baseline    Today his pain is 8/10VAS in severity. He describes his back pain as continuous burning and throbbing pain with intermittent tingling radiating into his left posterior leg and foot.  The radicular tingling is worsened with prolonged walking. His pain is worsened by activity, bending, and lifting.  His pain is improved by resting in his recliner. He also gets relief with use of a TENS unit and  Heating pad    He has previously completed PT which was temporarily helpful.  His pain would returned ~1 day after each session.     He continue with Gabapentin 300 mg BID, he is not seeing any improvement in his pain with this anymore.  He denies any side effects.     Patient remained masked during entire encounter. No cough present. I donned a mask and eye protection throughout entire visit. Prior to donning mask and eye protection, hand hygiene was performed, as well as when it was doffed.  I was closer than 6 feet, but not for an extended period of time. No obvious exposure to any bodily fluids.    Back Pain  This is a chronic (Injured on March 17, 2021) problem. The current episode started more than 1 month ago. The problem occurs constantly. The problem is unchanged. The pain is present in the lumbar spine. The quality of the pain is described as burning and aching. The pain radiates to the left thigh and left foot (tingling). The pain is at a severity of 8/10. The pain is the same all the time. The symptoms are aggravated by bending and standing (lifting, increased activity, walking). Pertinent negatives include no abdominal pain, dysuria,  fever, headaches, numbness or weakness. He has tried NSAIDs and heat (Gabapentin, TENS, PT) for the symptoms.      PEG Assessment   What number best describes your pain on average in the past week?8  What number best describes how, during the past week, pain has interfered with your enjoyment of life?8  What number best describes how, during the past week, pain has interfered with your general activity?  8    The following portions of the patient's history were reviewed and updated as appropriate: allergies, current medications, past family history, past medical history, past social history, past surgical history and problem list.    Review of Systems   Constitutional: Negative for activity change, fatigue and fever.   HENT: Negative for congestion.    Eyes: Negative for visual disturbance.   Respiratory: Negative for cough and chest tightness.    Gastrointestinal: Negative for abdominal pain, constipation and diarrhea.   Genitourinary: Negative for difficulty urinating and dysuria.   Musculoskeletal: Positive for back pain.   Neurological: Negative for dizziness, weakness, light-headedness, numbness and headaches.   Psychiatric/Behavioral: Positive for sleep disturbance. Negative for agitation and suicidal ideas. The patient is not nervous/anxious.      --  The aforementioned information the Chief Complaint section and above subjective data including any HPI data, and also the Review of Systems data, has been personally reviewed and affirmed.  --         Office visit from 10/28/2021 with Dr. Soriano reviewed.  Patient was referred by Dr. Lozada for evaluation treatment of back pain.  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  He describes his pain as aching, sharp, searing, grinding, tingling pain that is 90% back pain 10% extremity pain.  Assume gabapentin prescription 300 mg twice daily.  Schedule for LESI L5-S1 left of midline if possible.    Vitals:    12/08/21 1454   BP: 124/84   Pulse: 102   Resp: 16  "  Temp: 97.1 °F (36.2 °C)   SpO2: 100%   Weight: 107 kg (235 lb 3.2 oz)   Height: 193 cm (76\")   PainSc:   8   PainLoc: Back     Objective   Physical Exam  Vitals and nursing note reviewed.   Constitutional:       Appearance: Normal appearance. He is well-developed.   Eyes:      General: Lids are normal.   Cardiovascular:      Rate and Rhythm: Normal rate.   Pulmonary:      Effort: Pulmonary effort is normal.   Musculoskeletal:      Cervical back: Normal range of motion.      Lumbar back: Tenderness present. No bony tenderness. Decreased range of motion. Positive left straight leg raise test. Negative right straight leg raise test.   Neurological:      Mental Status: He is alert and oriented to person, place, and time.      Gait: Gait normal.   Psychiatric:         Attention and Perception: Attention normal.         Mood and Affect: Mood normal.         Speech: Speech normal.         Behavior: Behavior normal.         Judgment: Judgment normal.       Assessment/Plan   Diagnoses and all orders for this visit:    1. Lumbar radiculopathy (Primary)  -     gabapentin (NEURONTIN) 300 MG capsule; Take 1 capsule by mouth 3 (Three) Times a Day.  Dispense: 90 capsule; Refill: 0    2. Displacement of lumbar intervertebral disc without myelopathy      --- MRI of lumbar spine report from 4/7/2021 reviewed  --- Repeat LESI at L5-S1 with goal of obtaining therapeutic benefit  Reviewed the procedure at length with the patient.  Included in the review was expectations, complications, risk and benefits.The procedure was described in detail and the risks, benefits and alternatives were discussed with the patient (including but not limited to: bleeding, infection, nerve damage, worsening of pain, inability to perform injection, paralysis, seizures, coma, no pain relief and death) who agreed to proceed.  Discussed the potential for sedation if warranted/wanted.  The procedure will plan to be performed at [facility] " with fluoroscopic guidance(unless ultrasound is indicated) and could potentially have steroids and contrast dye used. Questions were answered and in a way the patient could understand.  Patient verbalized understanding and wishes to proceed.  This intervention will be ordered.  Discussed with patient that all procedures are part of a multimodal plan of care and include either formal PT or a home exercise program.  Patient has no evidence of coagulopathy or current infection.  --- Increase Gabapentin to 300 mg TID  --- Keep upcoming appointment with Dr. Lozada.   --- Follow-up after procedure.     HERBERT REPORT  As part of the patient's treatment plan, I am prescribing controlled substances. The patient has been made aware of appropriate use of such medications, including potential risk of somnolence, limited ability to drive and/or work safely, and the potential for dependence or overdose. It has also bee made clear that these medications are for use by this patient only, without concomitant use of alcohol or other substances unless prescribed.     As the clinician, I personally reviewed the HERBERT from 12/8/2021 while the patient was in the office today.    History and physical exam exhibit continued safe and appropriate use of controlled substances.    Dictated utilizing Dragon dictation.      This document is intended for medical expert use only. Reading of this document by patients and/or patient's family without participating medical staff guidance may result in misinterpretation and unintended morbidity.   Any interpretation of such data is the responsibility of the patient and/or family member responsible for the patient in concert with their primary or specialist providers, not to be left for sources of online searches such as [employer], [employer] or similar queries. Relying on these approaches to knowledge may result in misinterpretation, misguided goals of care and even death should patients or family members try  recommendations outside of the realm of professional medical care in a supervised way.

## 2021-12-09 ENCOUNTER — TRANSCRIBE ORDERS (OUTPATIENT)
Dept: SURGERY | Facility: SURGERY CENTER | Age: 31
End: 2021-12-09

## 2021-12-09 DIAGNOSIS — M54.16 LUMBAR RADICULOPATHY: Primary | ICD-10-CM

## 2021-12-14 ENCOUNTER — OFFICE VISIT (OUTPATIENT)
Dept: ORTHOPEDIC SURGERY | Facility: CLINIC | Age: 31
End: 2021-12-14

## 2021-12-14 VITALS — BODY MASS INDEX: 28.62 KG/M2 | TEMPERATURE: 97.9 F | HEIGHT: 76 IN | WEIGHT: 235 LBS

## 2021-12-14 DIAGNOSIS — M51.26 HERNIATED LUMBAR INTERVERTEBRAL DISC: Primary | ICD-10-CM

## 2021-12-14 DIAGNOSIS — M54.50 LUMBAR PAIN: ICD-10-CM

## 2021-12-14 DIAGNOSIS — M43.16 SPONDYLOLISTHESIS OF LUMBAR REGION: ICD-10-CM

## 2021-12-14 PROCEDURE — 99213 OFFICE O/P EST LOW 20 MIN: CPT | Performed by: ORTHOPAEDIC SURGERY

## 2021-12-14 NOTE — PROGRESS NOTES
He complains of continued left lower extremity pain and low back pain.  More back than leg pain is been off work for months now and failed to improve with physical therapy and epidural injections.  Epidurals did help temporarily for about 2 weeks he got 50% relief.  No bowel or bladder complaints.  On exam straight leg raise is positive on the left good strength.  Sensation is grossly intact.  MRI this spring showed a small left annular tear and disc protrusion at L5-S1.  X-rays have demonstrated retrolisthesis of mild extent at 4 5 and disc degeneration at L5-S1.  I am going to go ahead and repeat MRI for further evaluation with an eye toward surgical treatment probably discectomy and fusion at L5-S1.  No work meantime.

## 2021-12-20 ENCOUNTER — APPOINTMENT (OUTPATIENT)
Dept: GENERAL RADIOLOGY | Facility: SURGERY CENTER | Age: 31
End: 2021-12-20

## 2021-12-28 DIAGNOSIS — M54.16 LUMBAR RADICULOPATHY: ICD-10-CM

## 2021-12-28 RX ORDER — GABAPENTIN 300 MG/1
CAPSULE ORAL
Qty: 90 CAPSULE | Refills: 0 | Status: SHIPPED | OUTPATIENT
Start: 2021-12-28 | End: 2022-01-11

## 2022-01-11 ENCOUNTER — OFFICE VISIT (OUTPATIENT)
Dept: PAIN MEDICINE | Facility: CLINIC | Age: 32
End: 2022-01-11

## 2022-01-11 VITALS
SYSTOLIC BLOOD PRESSURE: 137 MMHG | WEIGHT: 242 LBS | TEMPERATURE: 96.8 F | HEART RATE: 75 BPM | OXYGEN SATURATION: 100 % | HEIGHT: 76 IN | RESPIRATION RATE: 16 BRPM | BODY MASS INDEX: 29.47 KG/M2 | DIASTOLIC BLOOD PRESSURE: 91 MMHG

## 2022-01-11 DIAGNOSIS — M54.16 LUMBAR RADICULOPATHY: Primary | ICD-10-CM

## 2022-01-11 DIAGNOSIS — M51.26 DISPLACEMENT OF LUMBAR INTERVERTEBRAL DISC WITHOUT MYELOPATHY: ICD-10-CM

## 2022-01-11 PROCEDURE — 99214 OFFICE O/P EST MOD 30 MIN: CPT | Performed by: NURSE PRACTITIONER

## 2022-01-11 RX ORDER — PREGABALIN 100 MG/1
100 CAPSULE ORAL 3 TIMES DAILY
Qty: 90 CAPSULE | Refills: 0 | Status: SHIPPED | OUTPATIENT
Start: 2022-01-11 | End: 2022-01-31 | Stop reason: SDUPTHER

## 2022-01-11 RX ORDER — HYDROCODONE BITARTRATE AND ACETAMINOPHEN 5; 325 MG/1; MG/1
1 TABLET ORAL EVERY 4 HOURS PRN
Qty: 18 TABLET | Refills: 0 | Status: SHIPPED | OUTPATIENT
Start: 2022-01-11 | End: 2022-01-24 | Stop reason: SDUPTHER

## 2022-01-11 NOTE — PROGRESS NOTES
CHIEF COMPLAINT  F/U back. Pt complaining of shooting and throbbing pain on his left leg .     Subjective   Javier Arriola is a 31 y.o. male  who presents for follow-up.  He has a history of back and left leg pain. Patient completed an LESI at L5-S1 on 11/10/2021 by Dr. Soriano with 80% relief from the procedure for 2 weeks and then his pain returned to baseline. Plan was to proceed with repeating his epidural with the goal of achieving therapeutic benefit, unfortunately this has not been authorized by workers comp yet.     Today his pain is 8/10VAS in severity. He continues to complain of left low back pain radiating into his left leg with tingling in his left foot. He states this worsens significantly with walking and standing. His pain is improved by rest.  He continues with Gabapentin 600 mg TID. Ibuprofen was not helpful so he stopped using this. He states that Gabapentin has helped to dull the pain, but that it remains severe with any walking or activity.     Patient remained masked during entire encounter. No cough present. I donned a mask and eye protection throughout entire visit. Prior to donning mask and eye protection, hand hygiene was performed, as well as when it was doffed.  I was closer than 6 feet, but not for an extended period of time. No obvious exposure to any bodily fluids.    Back Pain  This is a chronic (Injured on March 17, 2021) problem. The current episode started more than 1 month ago. The problem occurs constantly. The problem has been gradually worsening since onset. The pain is present in the lumbar spine. The quality of the pain is described as burning and aching. The pain radiates to the left thigh and left foot (tingling). The pain is at a severity of 8/10. The pain is the same all the time. The symptoms are aggravated by bending and standing (lifting, increased activity, walking). Pertinent negatives include no abdominal pain, chest pain, dysuria, fever, headaches, numbness or  weakness. He has tried NSAIDs and heat (Gabapentin, TENS, PT) for the symptoms.      PEG Assessment   What number best describes your pain on average in the past week?9  What number best describes how, during the past week, pain has interfered with your enjoyment of life?9  What number best describes how, during the past week, pain has interfered with your general activity?  9    The following portions of the patient's history were reviewed and updated as appropriate: allergies, current medications, past family history, past medical history, past social history, past surgical history and problem list.    Review of Systems   Constitutional: Negative for activity change, fatigue and fever.   HENT: Negative for congestion.    Eyes: Negative for visual disturbance.   Respiratory: Negative for cough and chest tightness.    Cardiovascular: Negative for chest pain.   Gastrointestinal: Negative for abdominal pain, constipation and diarrhea.   Genitourinary: Negative for difficulty urinating and dysuria.   Musculoskeletal: Positive for back pain.   Neurological: Negative for dizziness, weakness, light-headedness, numbness and headaches.   Psychiatric/Behavioral: Positive for sleep disturbance. Negative for agitation and suicidal ideas. The patient is not nervous/anxious.      --  The aforementioned information the Chief Complaint section and above subjective data including any HPI data, and also the Review of Systems data, has been personally reviewed and affirmed.  --    Office visit from 12/14/2020 with Dr. Lozada reviewed.  Patient continues to complain of left lower extremity pain and low back pain.  He has been off work for months and failed to improve with PT and epidural injections.  Plan to repeat MRI for further evaluation with an eye toward surgical treatment probably discectomy and fusion at L5-S1.    Vitals:    01/11/22 1106   BP: 137/91   Pulse: 75   Resp: 16   Temp: 96.8 °F (36 °C)   SpO2: 100%   Weight: 110 kg  "(242 lb)   Height: 193 cm (76\")   PainSc:   8   PainLoc: Back  Comment: leg     Objective   Physical Exam  Vitals and nursing note reviewed.   Constitutional:       Appearance: Normal appearance. He is well-developed.   Eyes:      General: Lids are normal.   Cardiovascular:      Rate and Rhythm: Normal rate.   Pulmonary:      Effort: Pulmonary effort is normal.   Musculoskeletal:      Cervical back: Normal range of motion.      Lumbar back: Tenderness present. Decreased range of motion. Positive left straight leg raise test. Negative right straight leg raise test.   Neurological:      Mental Status: He is alert and oriented to person, place, and time.      Gait: Gait abnormal.   Psychiatric:         Attention and Perception: Attention normal.         Mood and Affect: Mood normal.         Speech: Speech normal.         Behavior: Behavior normal.         Judgment: Judgment normal.       Assessment/Plan   Diagnoses and all orders for this visit:    1. Lumbar radiculopathy (Primary)    2. Displacement of lumbar intervertebral disc without myelopathy    Other orders  -     pregabalin (LYRICA) 100 MG capsule; Take 1 capsule by mouth 3 (Three) Times a Day.  Dispense: 90 capsule; Refill: 0  -     HYDROcodone-acetaminophen (NORCO) 5-325 MG per tablet; Take 1 tablet by mouth Every 4 (Four) Hours As Needed for Severe Pain .  Dispense: 18 tablet; Refill: 0      --- Additional contact information for his Workers Comp  obtained in office today, I will give this to out  in the hopes that we can get his epidural authorized.   --- Patient states the MRI for lumbar spine ordered by Dr. Lozada has been approved and has been scheduled on 1/17/2022. He is scheduled to follow up with Dr. Lozada on 1/18/2022. Encouraged to keep these appts.   --- Stop Gabapentin, start Lyrica 100 mg TID. Discussed medication with the patient.  Included in this discussion was the potential for side effects and adverse events.  Patient " verbalized understanding and wished to proceed.  Prescription will be sent to pharmacy.  --- The urine drug screen confirmation from 10/28/2021 has been reviewed and the result is appropriate based on patient history and HERBERT report  --- Acute supply of Norco 5/325 #18. Discussed in detail that we have no plans to consider long term opioids. This is to be used to help control  His pain while we work with getting his procedure approved through workers comp. Patient states understanding.   --- Follow-up 1 month or sooner if needed.      HERBERT REPORT  As part of the patient's treatment plan, I am prescribing controlled substances. The patient has been made aware of appropriate use of such medications, including potential risk of somnolence, limited ability to drive and/or work safely, and the potential for dependence or overdose. It has also bee made clear that these medications are for use by this patient only, without concomitant use of alcohol or other substances unless prescribed.     Patient has completed prescribing agreement detailing terms of continued prescribing of controlled substances, including monitoring HERBERT reports, urine drug screening, and pill counts if necessary. The patient is aware that inappropriate use will results in cessation of prescribing such medications.    As the clinician, I personally reviewed the HERBERT from 1/11/2022 while the patient was in the office today.    History and physical exam exhibit continued safe and appropriate use of controlled substances.    Dictated utilizing Dragon dictation.     This document is intended for medical expert use only. Reading of this document by patients and/or patient's family without participating medical staff guidance may result in misinterpretation and unintended morbidity.   Any interpretation of such data is the responsibility of the patient and/or family member responsible for the patient in concert with their primary or specialist  providers, not to be left for sources of online searches such as [employer] or similar queries. Relying on these approaches to knowledge may result in misinterpretation, misguided goals of care and even death should patients or family members try recommendations outside of the realm of professional medical care in a supervised way.

## 2022-01-18 ENCOUNTER — APPOINTMENT (OUTPATIENT)
Dept: MRI IMAGING | Facility: HOSPITAL | Age: 32
End: 2022-01-18

## 2022-01-18 ENCOUNTER — OFFICE VISIT (OUTPATIENT)
Dept: ORTHOPEDIC SURGERY | Facility: CLINIC | Age: 32
End: 2022-01-18

## 2022-01-18 VITALS — BODY MASS INDEX: 29.24 KG/M2 | TEMPERATURE: 97.1 F | WEIGHT: 240.1 LBS | HEIGHT: 76 IN

## 2022-01-18 DIAGNOSIS — M54.50 LUMBAR PAIN: Primary | ICD-10-CM

## 2022-01-18 DIAGNOSIS — M51.26 HERNIATED LUMBAR INTERVERTEBRAL DISC: ICD-10-CM

## 2022-01-18 PROCEDURE — 99214 OFFICE O/P EST MOD 30 MIN: CPT | Performed by: ORTHOPAEDIC SURGERY

## 2022-01-18 NOTE — PROGRESS NOTES
He fails to improve and has continued back pain left leg pain and a positive straight leg raise test on examination.  He has a lumbar herniated disc at L5-S1 and a small annular tear on that side and slight retrolisthesis at 4 5.  The MRI was obtained nearly a year ago and reportedly demonstrated the above findings.  I do not know that I ever saw it personally or in any event do not have a recording thereof.  In any event has been nearly a year since that was obtained he has failed to improve with conservative care including physical therapy, time off of work, epidural steroid injections which did help temporarily for about 2 weeks.  I now need a new MRI with an eye toward planning likely lumbosacral decompression and fusion.  I want to look at the adjacent L4-5 level which look fine by report on the last MRI.  I am going to keep the patient off work until this request is met.  There is no point in me seeing him back until this request is met.

## 2022-01-24 RX ORDER — HYDROCODONE BITARTRATE AND ACETAMINOPHEN 5; 325 MG/1; MG/1
1 TABLET ORAL EVERY 4 HOURS PRN
Qty: 18 TABLET | Refills: 0 | Status: SHIPPED | OUTPATIENT
Start: 2022-01-24 | End: 2022-02-04 | Stop reason: SDUPTHER

## 2022-01-24 NOTE — TELEPHONE ENCOUNTER
Will provide 1 more acute supply. Our long term plan does not include opioids. Will plan to optimize lyrica at our next office visit.

## 2022-01-31 DIAGNOSIS — M54.16 LUMBAR RADICULOPATHY: ICD-10-CM

## 2022-01-31 RX ORDER — PREGABALIN 100 MG/1
100 CAPSULE ORAL 3 TIMES DAILY
Qty: 3 CAPSULE | Refills: 0 | Status: SHIPPED | OUTPATIENT
Start: 2022-01-31 | End: 2022-02-06 | Stop reason: SDUPTHER

## 2022-01-31 NOTE — TELEPHONE ENCOUNTER
Temporary supply to get him to his appt. Will likely need to increase this dosage at his appt. Thanks

## 2022-02-01 RX ORDER — GABAPENTIN 300 MG/1
CAPSULE ORAL
Qty: 90 CAPSULE | OUTPATIENT
Start: 2022-02-01

## 2022-02-04 DIAGNOSIS — M51.26 DISPLACEMENT OF LUMBAR INTERVERTEBRAL DISC WITHOUT MYELOPATHY: ICD-10-CM

## 2022-02-04 DIAGNOSIS — M54.16 LUMBAR RADICULOPATHY: Primary | ICD-10-CM

## 2022-02-04 RX ORDER — HYDROCODONE BITARTRATE AND ACETAMINOPHEN 5; 325 MG/1; MG/1
1 TABLET ORAL EVERY 4 HOURS PRN
Qty: 18 TABLET | Refills: 0 | Status: SHIPPED | OUTPATIENT
Start: 2022-02-04 | End: 2022-02-11

## 2022-02-04 NOTE — TELEPHONE ENCOUNTER
He needs to make sure we have all of the appropriate information because this is not whats documented by Anila.  It appears she has both emailed and called with no response.

## 2022-02-04 NOTE — TELEPHONE ENCOUNTER
He states that he has tried several times to contact the workers comp and they told him that they contacted us and we are not calling them back.

## 2022-02-04 NOTE — TELEPHONE ENCOUNTER
He stated that he thinks they are giving him the run around because they keep saying they havent received any info from [employer] as well.

## 2022-02-04 NOTE — TELEPHONE ENCOUNTER
No plans to increase medication.  Last acute supply until seen in office. Will be optimizing Lyrica with goal of avoid opioids if possible. He also needs to contact his workers comp rep as they have not responded to us and we have been unable to get his procedure approved.

## 2022-02-07 RX ORDER — PREGABALIN 100 MG/1
100 CAPSULE ORAL 3 TIMES DAILY
Qty: 90 CAPSULE | Refills: 0 | Status: SHIPPED | OUTPATIENT
Start: 2022-02-07 | End: 2022-02-25 | Stop reason: SDUPTHER

## 2022-02-07 NOTE — TELEPHONE ENCOUNTER
It was supposed to be a temporary supply. Will you check with this patient and see if he prefers the Lyrica to the Gabapentin as he was switched at his last visit. If so i'll send this in. Thanks

## 2022-02-11 ENCOUNTER — OFFICE VISIT (OUTPATIENT)
Dept: PAIN MEDICINE | Facility: CLINIC | Age: 32
End: 2022-02-11

## 2022-02-11 VITALS
DIASTOLIC BLOOD PRESSURE: 94 MMHG | TEMPERATURE: 97.5 F | HEART RATE: 81 BPM | RESPIRATION RATE: 18 BRPM | HEIGHT: 76 IN | OXYGEN SATURATION: 98 % | SYSTOLIC BLOOD PRESSURE: 138 MMHG | WEIGHT: 249.9 LBS | BODY MASS INDEX: 30.43 KG/M2

## 2022-02-11 DIAGNOSIS — M54.16 LUMBAR RADICULOPATHY: Primary | ICD-10-CM

## 2022-02-11 DIAGNOSIS — M51.26 DISPLACEMENT OF LUMBAR INTERVERTEBRAL DISC WITHOUT MYELOPATHY: ICD-10-CM

## 2022-02-11 LAB
POC AMPHETAMINES: NEGATIVE
POC BARBITURATES: NEGATIVE
POC BENZODIAZEPHINES: NEGATIVE
POC COCAINE: NEGATIVE
POC METHADONE: NEGATIVE
POC METHAMPHETAMINE SCREEN URINE: NEGATIVE
POC OPIATES: NEGATIVE
POC OXYCODONE: NEGATIVE
POC PHENCYCLIDINE: NEGATIVE
POC PROPOXYPHENE: NEGATIVE
POC THC: NEGATIVE
POC TRICYCLIC ANTIDEPRESSANTS: NEGATIVE

## 2022-02-11 PROCEDURE — 99214 OFFICE O/P EST MOD 30 MIN: CPT | Performed by: NURSE PRACTITIONER

## 2022-02-11 PROCEDURE — 80305 DRUG TEST PRSMV DIR OPT OBS: CPT | Performed by: NURSE PRACTITIONER

## 2022-02-11 RX ORDER — HYDROCODONE BITARTRATE AND ACETAMINOPHEN 5; 325 MG/1; MG/1
1 TABLET ORAL EVERY 12 HOURS PRN
Qty: 60 TABLET | Refills: 0 | Status: SHIPPED | OUTPATIENT
Start: 2022-02-11 | End: 2022-03-06 | Stop reason: SDUPTHER

## 2022-02-11 NOTE — PROGRESS NOTES
CHIEF COMPLAINT  Follow-up for back pain.    Subjective   Javier Arriola is a 31 y.o. male  who presents for follow-up.  He has a history of back pain. Patient completed LESI at L5-S1 on 11/10/2021 with Dr. Soriano.  He had 80% relief x2 weeks and then his pain returned to baseline.  Our goal has been to proceed with a second LESI with the goal building up on his therapeutic relief.  Unfortunately our office has had issues in getting response from the patient's workers comp and we have been unable to get this approved.  In the meantime Dr. Lozada does feel that the patient will likely need to proceed with surgery.  Plan to update lumbar MRI, unfortunately there have been issues getting this approved through Worker's Comp. as well.    Today his pain is 8/10VAS in severity.  His low back and left leg pain is particularly worsened with activity and at night.     At his last office visit we switched his gabapentin to Lyrica.  He continues with Lyrica 100 mg 3 times daily and states that this has been more helpful compared to the gabapentin.  He states the Lyrica eliminates his pain as long as he is not standing and walking, but he continues to have severe pain with activity.  He states the acute supplies of Norco 5/325 have allowed him to remain active. He denies any side effects with his medication.     Patient remained masked during entire encounter. No cough present. I donned a mask and eye protection throughout entire visit. Prior to donning mask and eye protection, hand hygiene was performed, as well as when it was doffed.  I was closer than 6 feet, but not for an extended period of time. No obvious exposure to any bodily fluids.    Back Pain  This is a chronic (Injured on March 17, 2021) problem. The current episode started more than 1 month ago. The problem occurs constantly. The problem has been gradually worsening since onset. The pain is present in the lumbar spine. The quality of the pain is described as  burning and aching. The pain radiates to the left thigh and left foot (tingling). The pain is at a severity of 8/10. The pain is the same all the time. The symptoms are aggravated by bending and standing (lifting, increased activity, walking). Associated symptoms include numbness (left leg). Pertinent negatives include no abdominal pain, chest pain, dysuria, fever, headaches or weakness. He has tried NSAIDs and heat (Lyrica, Norco, TENS, PT) for the symptoms.        PEG Assessment   What number best describes your pain on average in the past week?6  What number best describes how, during the past week, pain has interfered with your enjoyment of life?9  What number best describes how, during the past week, pain has interfered with your general activity?  9    Review of Pertinent Medical Data ---  Office visit from 1/18/2022 with Dr. Lozada reviewed.  Patient fails to improve and has continued back pain, left leg pain, positive straight leg raise test.  He has a herniated disc at L5-S1 and slight retrolisthesis L4-5.  He has failed to improve with conservative care including physical therapy, time off work, and epidural steroid injection.  Patient now needs a new MRI with an eye toward planning likely lumbosacral decompression and fusion.    The following portions of the patient's history were reviewed and updated as appropriate: allergies, current medications, past family history, past medical history, past social history, past surgical history and problem list.    Review of Systems   Constitutional: Negative for fatigue and fever.   HENT: Negative for congestion.    Eyes: Negative for visual disturbance.   Respiratory: Negative for shortness of breath.    Cardiovascular: Negative for chest pain.   Gastrointestinal: Negative for abdominal pain, constipation and diarrhea.   Genitourinary: Negative for difficulty urinating and dysuria.   Musculoskeletal: Positive for back pain.   Neurological: Positive for numbness  "(left leg). Negative for weakness and headaches.   Psychiatric/Behavioral: Positive for sleep disturbance. Negative for suicidal ideas. The patient is not nervous/anxious.      --  The aforementioned information the Chief Complaint section and above subjective data including any HPI data, and also the Review of Systems data, has been personally reviewed and affirmed.  --    Vitals:    02/11/22 1357   BP: 138/94   Pulse: 81   Resp: 18   Temp: 97.5 °F (36.4 °C)   SpO2: 98%   Weight: 113 kg (249 lb 14.4 oz)   Height: 193 cm (76\")   PainSc:   8   PainLoc: Back     Objective   Physical Exam  Vitals and nursing note reviewed.   Constitutional:       Appearance: Normal appearance. He is well-developed.   Eyes:      General: Lids are normal.   Cardiovascular:      Rate and Rhythm: Normal rate.   Pulmonary:      Effort: Pulmonary effort is normal.   Musculoskeletal:      Cervical back: Normal range of motion.      Lumbar back: Tenderness present. Decreased range of motion. Positive left straight leg raise test.   Neurological:      Mental Status: He is alert and oriented to person, place, and time.      Gait: Gait abnormal.   Psychiatric:         Attention and Perception: Attention normal.         Mood and Affect: Mood normal.         Speech: Speech normal.         Behavior: Behavior normal.         Judgment: Judgment normal.       Assessment/Plan   Diagnoses and all orders for this visit:    1. Lumbar radiculopathy (Primary)  -     Urine Drug Screen Confirmation - Urine, Clean Catch; Future  -     POC Urine Drug Screen, Triage    2. Displacement of lumbar intervertebral disc without myelopathy  -     Urine Drug Screen Confirmation - Urine, Clean Catch; Future  -     POC Urine Drug Screen, Triage    Other orders  -     HYDROcodone-acetaminophen (NORCO) 5-325 MG per tablet; Take 1 tablet by mouth Every 12 (Twelve) Hours As Needed for Moderate Pain . 30 day supply  Dispense: 60 tablet; Refill: 0      --- Routine UDS in office " today as part of monitoring requirements for controlled substances.  The specimen was viewed and the immunoassay result reviewed and is NEG.  This specimen will be sent to [facility] laboratory for confirmation.     --- Increase Lyrica to 100 mg in the morning, 100 mg in the afternoon, and 200 mg at night. (Will anticipate need for early refill as we have increased this).   --- Start Norco 5/325 BID PRN. Discussed with the patient regarding long-term side effects of opioids including but not limited to dependence, addiction, sedation, respiratory depression, opioid induced hormonal suppression, hyperalgesia, and elevated risk of myocardial infarction.  --- CSA explained and signed in office today.   --- Proceed with LESI once approved by workers comp.   --- Continue to work with Dr. Lozada's office.   --- Follow-up 1 month or sooner if needed.      HERBERT REPORT  As part of the patient's treatment plan, I am prescribing controlled substances. The patient has been made aware of appropriate use of such medications, including potential risk of somnolence, limited ability to drive and/or work safely, and the potential for dependence or overdose. It has also bee made clear that these medications are for use by this patient only, without concomitant use of alcohol or other substances unless prescribed.     Patient has completed prescribing agreement detailing terms of continued prescribing of controlled substances, including monitoring HERBERT reports, urine drug screening, and pill counts if necessary. The patient is aware that inappropriate use will results in cessation of prescribing such medications.    As the clinician, I personally reviewed the HERBERT from 2/11/2022 while the patient was in the office today.    History and physical exam exhibit continued safe and appropriate use of controlled substances.    Dictated utilizing Dragon dictation.     This document is intended for medical expert use only. Reading of this  document by patients and/or patient's family without participating medical staff guidance may result in misinterpretation and unintended morbidity.   Any interpretation of such data is the responsibility of the patient and/or family member responsible for the patient in concert with their primary or specialist providers, not to be left for sources of online searches such as [employer], [employer] or similar queries. Relying on these approaches to knowledge may result in misinterpretation, misguided goals of care and even death should patients or family members try recommendations outside of the realm of professional medical care in a supervised way.

## 2022-02-28 ENCOUNTER — TELEPHONE (OUTPATIENT)
Dept: PAIN MEDICINE | Facility: CLINIC | Age: 32
End: 2022-02-28

## 2022-02-28 RX ORDER — PREGABALIN 100 MG/1
CAPSULE ORAL
Qty: 120 CAPSULE | Refills: 0 | Status: SHIPPED | OUTPATIENT
Start: 2022-02-28 | End: 2022-03-15

## 2022-02-28 RX ORDER — PREGABALIN 100 MG/1
100 CAPSULE ORAL 3 TIMES DAILY
Qty: 90 CAPSULE | Refills: 0 | Status: SHIPPED | OUTPATIENT
Start: 2022-02-28 | End: 2022-02-28 | Stop reason: SDUPTHER

## 2022-02-28 NOTE — TELEPHONE ENCOUNTER
Were you aware to the change in his lyrica. Per chen's note he is takes 100 mg  in the am and afternoon and 200 mg in the evening. They will not allow him to  the lyrica because it is early based on the sig. He be out tomorrow because of this change.

## 2022-02-28 NOTE — TELEPHONE ENCOUNTER
I was not aware, but I see that in the note now.  I have sent a new prescription with the current SIG and request to cancel previous prescription/allow early fill.

## 2022-03-01 ENCOUNTER — TELEPHONE (OUTPATIENT)
Dept: PAIN MEDICINE | Facility: CLINIC | Age: 32
End: 2022-03-01

## 2022-03-01 NOTE — TELEPHONE ENCOUNTER
Caller: CLAUDIA KELLEY    Relationship: NURSE     Best call back number: [phone]    What form or medical record are you requesting: MEDICAL RECORDS FOR 02-    Who is requesting this form or medical record from you: [employer]    How would you like to receive the form or medical records (pick-up, mail, fax): [phone]      Timeframe paperwork needed: ASAP    Additional notes: NURSE  ALSO NEEDS TO KNOW IF PATIENT NEEDS SURGERY AND A REPEAT MRI 12-.

## 2022-03-04 NOTE — TELEPHONE ENCOUNTER
Called patient to verify that he wants the records to be faxed. No answer. Unable to leave voicemail.

## 2022-03-07 RX ORDER — HYDROCODONE BITARTRATE AND ACETAMINOPHEN 5; 325 MG/1; MG/1
1 TABLET ORAL EVERY 12 HOURS PRN
Qty: 60 TABLET | Refills: 0 | Status: SHIPPED | OUTPATIENT
Start: 2022-03-13 | End: 2022-03-14 | Stop reason: SDUPTHER

## 2022-03-11 NOTE — TELEPHONE ENCOUNTER
Per pharmacy med is out of stock  For NORCO 5-325 mg and wont have any until the 03/14/2022 . Pt wants us to call in med to [facility] in [facility] which is on file . I will call previous pharmacy to cancel RX . Thanks LBS .

## 2022-03-14 RX ORDER — HYDROCODONE BITARTRATE AND ACETAMINOPHEN 5; 325 MG/1; MG/1
1 TABLET ORAL EVERY 12 HOURS PRN
Qty: 60 TABLET | Refills: 0 | Status: SHIPPED | OUTPATIENT
Start: 2022-03-14 | End: 2022-04-11 | Stop reason: SDUPTHER

## 2022-03-14 NOTE — TELEPHONE ENCOUNTER
Could you please resend Mr. Arriola's Rx to the pharmacy? The previous Rx was cancelled due to patient's pharmacy not having the hydro/apap in stock until today. A new Rx wasn't sent anywhere and now he's out of medication. I called his current pharmacy and they have the medication in stock now.

## 2022-03-14 NOTE — TELEPHONE ENCOUNTER
I can't tell if this has been handled already.  It looks like this encounter was already closed.  Can someone please let me know if it still needs to be sent?

## 2022-03-15 ENCOUNTER — OFFICE VISIT (OUTPATIENT)
Dept: PAIN MEDICINE | Facility: CLINIC | Age: 32
End: 2022-03-15

## 2022-03-15 ENCOUNTER — OFFICE VISIT (OUTPATIENT)
Dept: ORTHOPEDIC SURGERY | Facility: CLINIC | Age: 32
End: 2022-03-15

## 2022-03-15 VITALS
SYSTOLIC BLOOD PRESSURE: 142 MMHG | BODY MASS INDEX: 29.76 KG/M2 | HEIGHT: 76 IN | OXYGEN SATURATION: 99 % | HEART RATE: 94 BPM | WEIGHT: 244.4 LBS | RESPIRATION RATE: 12 BRPM | TEMPERATURE: 96.8 F | DIASTOLIC BLOOD PRESSURE: 95 MMHG

## 2022-03-15 VITALS — WEIGHT: 255.3 LBS | TEMPERATURE: 98 F | HEIGHT: 76 IN | BODY MASS INDEX: 31.09 KG/M2

## 2022-03-15 DIAGNOSIS — M43.10 RETROLISTHESIS OF VERTEBRAE: ICD-10-CM

## 2022-03-15 DIAGNOSIS — M51.26 DISPLACEMENT OF LUMBAR INTERVERTEBRAL DISC WITHOUT MYELOPATHY: ICD-10-CM

## 2022-03-15 DIAGNOSIS — Z79.899 ENCOUNTER FOR LONG-TERM (CURRENT) USE OF HIGH-RISK MEDICATION: ICD-10-CM

## 2022-03-15 DIAGNOSIS — M54.16 LUMBAR RADICULOPATHY: Primary | ICD-10-CM

## 2022-03-15 DIAGNOSIS — M51.26 HERNIATED LUMBAR INTERVERTEBRAL DISC: Primary | ICD-10-CM

## 2022-03-15 PROCEDURE — 99214 OFFICE O/P EST MOD 30 MIN: CPT | Performed by: ORTHOPAEDIC SURGERY

## 2022-03-15 PROCEDURE — 99213 OFFICE O/P EST LOW 20 MIN: CPT | Performed by: NURSE PRACTITIONER

## 2022-03-15 RX ORDER — PREGABALIN 200 MG/1
200 CAPSULE ORAL 3 TIMES DAILY
Qty: 90 CAPSULE | Refills: 0 | Status: SHIPPED | OUTPATIENT
Start: 2022-03-15 | End: 2022-04-04 | Stop reason: SDUPTHER

## 2022-03-15 NOTE — PROGRESS NOTES
"CHIEF COMPLAINT    FOLLOW UP BACK PAIN.     1Month F/U visit, complains of sleep disturbances due to increased pain over the last month.     Subjective   Javier Arriola is a 31 y.o. male  who presents for follow-up.  He has a history of back and left leg pain.  Today his pain is 7/10VAS in severity. He is now taking Lyrica 100 mg in the morning, 100 mg in the afternoon, and 200 mg in the evening.  He is also on Norco 5/325 2/day.  This medication regimen decreases his pain by \"up to 50%\".  He denies any side effects including somnolence or constipation.     Discussed that with Dr. Lozada recommending surgery I do suggest weaning his Norco 5/325 as much as possible so as to avoid hindering his post-operative pain control. Patient states understanding.     Procedure list:  11/10/2021-LESI at L5-S1-80% relief x2 weeks   Unfortunately Worker's Comp. has not approved any further epidurals.     Patient remained masked during entire encounter. No cough present. I donned a mask and eye protection throughout entire visit. Prior to donning mask and eye protection, hand hygiene was performed, as well as when it was doffed.  I was closer than 6 feet, but not for an extended period of time. No obvious exposure to any bodily fluids.    Back Pain  This is a chronic (Injured on March 17, 2021) problem. The current episode started more than 1 month ago. The problem occurs constantly. The problem is unchanged. The pain is present in the lumbar spine. The quality of the pain is described as burning and aching. The pain radiates to the left thigh and left foot (tingling). The pain is at a severity of 7/10. The pain is the same all the time. The symptoms are aggravated by bending and standing (lifting, increased activity, walking). Associated symptoms include numbness (left leg). Pertinent negatives include no abdominal pain, chest pain, dysuria, fever, headaches or weakness. He has tried NSAIDs and heat (Lyrica, Norco, TENS, PT) for " "the symptoms.      PEG Assessment   What number best describes your pain on average in the past week?9  What number best describes how, during the past week, pain has interfered with your enjoyment of life?9  What number best describes how, during the past week, pain has interfered with your general activity?  9    Review of Pertinent Medical Data ---  Office visit from 3/15/2022 with Dr. Lozada reviewed.  Recommendation for L5-S1 laminectomy, discectomy, and fusion with instrumentation.  Discectomy alone is not likely to help his back pain.    The following portions of the patient's history were reviewed and updated as appropriate: allergies, current medications, past family history, past medical history, past social history, past surgical history and problem list.    Review of Systems   Constitutional: Positive for fatigue. Negative for activity change and fever.   HENT: Negative for congestion.    Eyes: Negative for visual disturbance.   Respiratory: Negative for cough and chest tightness.    Cardiovascular: Negative for chest pain.   Gastrointestinal: Negative for abdominal pain, constipation and diarrhea.   Genitourinary: Negative for difficulty urinating and dysuria.   Musculoskeletal: Positive for back pain.   Neurological: Positive for numbness (left leg). Negative for dizziness, weakness, light-headedness and headaches.   Psychiatric/Behavioral: Positive for sleep disturbance (pain). Negative for agitation and suicidal ideas. The patient is not nervous/anxious.      --  The aforementioned information the Chief Complaint section and above subjective data including any HPI data, and also the Review of Systems data, has been personally reviewed and affirmed.  --    Vitals:    03/15/22 1402   BP: 142/95   BP Location: Right arm   Patient Position: Sitting   Pulse: 94   Resp: 12   Temp: 96.8 °F (36 °C)   SpO2: 99%   Weight: 111 kg (244 lb 6.4 oz)   Height: 193 cm (76\")   PainSc:   7   PainLoc: Back     Objective "   Physical Exam  Vitals and nursing note reviewed.   Constitutional:       Appearance: Normal appearance. He is well-developed.   Eyes:      General: Lids are normal.   Cardiovascular:      Rate and Rhythm: Normal rate.   Pulmonary:      Effort: Pulmonary effort is normal.   Musculoskeletal:      Lumbar back: Tenderness and bony tenderness present. Decreased range of motion.   Neurological:      Mental Status: He is alert and oriented to person, place, and time.      Gait: Gait abnormal.   Psychiatric:         Attention and Perception: Attention normal.         Mood and Affect: Mood normal.         Speech: Speech normal.         Behavior: Behavior normal.         Judgment: Judgment normal.       Assessment/Plan   Diagnoses and all orders for this visit:    1. Lumbar radiculopathy (Primary)    2. Displacement of lumbar intervertebral disc without myelopathy    3. Encounter for long-term (current) use of high-risk medication    Other orders  -     pregabalin (Lyrica) 200 MG capsule; Take 1 capsule by mouth 3 (Three) Times a Day.  Dispense: 90 capsule; Refill: 0      --- The urine drug screen confirmation from 2/11/2022 has been reviewed and the result is appropriate based on patient history and HERBERT report  --- CSA updated 2/11/2022  --- Increase Lyrica to 200 mg TID.   --- Recommend that he try to begin weaning his Norco 5/325 in anticipation of surgery as this will help with his post-operative pain control.  Patient states understanding. Discussed again that our goal is to avoid long term opioids.   --- Follow-up 1 month or sooner if needed.      HERBERT REPORT  As part of the patient's treatment plan, I am prescribing controlled substances. The patient has been made aware of appropriate use of such medications, including potential risk of somnolence, limited ability to drive and/or work safely, and the potential for dependence or overdose. It has also bee made clear that these medications are for use by this  patient only, without concomitant use of alcohol or other substances unless prescribed.     Patient has completed prescribing agreement detailing terms of continued prescribing of controlled substances, including monitoring HERBERT reports, urine drug screening, and pill counts if necessary. The patient is aware that inappropriate use will results in cessation of prescribing such medications.    As the clinician, I personally reviewed the HERBERT from 3/15/2022 while the patient was in the office today.    History and physical exam exhibit continued safe and appropriate use of controlled substances.    Dictated utilizing Dragon dictation.     This document is intended for medical expert use only. Reading of this document by patients and/or patient's family without participating medical staff guidance may result in misinterpretation and unintended morbidity.   Any interpretation of such data is the responsibility of the patient and/or family member responsible for the patient in concert with their primary or specialist providers, not to be left for sources of online searches such as [employer], [employer] or similar queries. Relying on these approaches to knowledge may result in misinterpretation, misguided goals of care and even death should patients or family members try recommendations outside of the realm of professional medical care in a supervised way.

## 2022-03-15 NOTE — PROGRESS NOTES
New patient or new problem visit    CC: Low back pain left leg pain    HPI: He has low back pain, left leg pain and weakness since a work accident which occurred March 15 a year ago.  Well-documented on my note of 5/24/2021 and wrote will not be repeated here.  Suffice to say he is sustained a lumbosacral herniated disc (I am going to call at L5-S1 because the radiologist insist on doing so and I believe it will streamline his is administrative care from this point on-it is the retrolisthesis take, degenerative level above the transitional formed but immobile segment below that there calling S1-S2.  In previous notes I called the level of concern L4-5 ) he has exhausted conservative care with time, time off work, natural history, epidural steroid injection which gave some temporary relief for 2 weeks and extensive physical therapy.  He wants to return to work but cannot do his job in his current state.  He had no back pain or leg pain before this injury.    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    ROS: As above    PE: BMI 31.1.  Back is mildly tender to palpation at the lumbosacral junction.  No palpable deformity.  Good strength in the legs bilaterally 4/5 in the left EHL (was 3/5 on first visit last year) otherwise good strength and sensation.  Straight leg raise is now equivocal.    XRAY: Recent MRI demonstrates L5-S1 central disc protrusion, slight retrolisthesis disc degeneration and an S1-S2 atavistic disc space with transitional junction to the pelvis.  Plain film x-rays show slight retrolisthesis.  I reviewed the radiologist report with which I agree.    Other: n/a    Impression: L5-S1 disc protrusion, chronic back and leg pain and retrolisthesis which is failed to improve with conservative care.  No symptoms preceded the work injury.    Plan: I have recommended L5-S1 laminectomy, discectomy and fusion with instrumentation.  Discectomy alone is not likely to help his back pain.  I discussed the  risks and benefits of posterior spinal fusion, including where applicable, laminectomy.  Risks include adverse anesthetic events such as death, stroke and myocardial infarction.  More specific surgical risks include infection, nonunion, hardware failure, spinal fluid leakage, nerve root injury or paralysis, visceral or vascular injury, persistent pain, deep venous thrombosis, and pulmonary embolism among others. There is a 70 to 90 % chance of success.   Alternatives have been discussed.  After careful consideration the patient wishes to proceed with surgery.  I will keep him off work until the surgery is performed.  He will likely be off for 3 to 4 months afterward as well.  He will likely have some element of permanent impairment but I anticipate he will be able to return to work with a single level fusion.

## 2022-04-05 RX ORDER — PREGABALIN 200 MG/1
200 CAPSULE ORAL 3 TIMES DAILY
Qty: 90 CAPSULE | Refills: 0 | Status: SHIPPED | OUTPATIENT
Start: 2022-04-05 | End: 2022-05-05 | Stop reason: SDUPTHER

## 2022-04-11 RX ORDER — HYDROCODONE BITARTRATE AND ACETAMINOPHEN 5; 325 MG/1; MG/1
1 TABLET ORAL EVERY 12 HOURS PRN
Qty: 60 TABLET | Refills: 0 | Status: SHIPPED | OUTPATIENT
Start: 2022-04-11 | End: 2022-05-05 | Stop reason: SDUPTHER

## 2022-04-12 ENCOUNTER — TELEPHONE (OUTPATIENT)
Dept: ORTHOPEDIC SURGERY | Facility: CLINIC | Age: 32
End: 2022-04-12

## 2022-04-13 ENCOUNTER — TELEPHONE (OUTPATIENT)
Dept: ORTHOPEDIC SURGERY | Facility: CLINIC | Age: 32
End: 2022-04-13

## 2022-04-14 ENCOUNTER — TELEPHONE (OUTPATIENT)
Dept: ORTHOPEDIC SURGERY | Facility: CLINIC | Age: 32
End: 2022-04-14

## 2022-04-15 ENCOUNTER — OFFICE VISIT (OUTPATIENT)
Dept: PAIN MEDICINE | Facility: CLINIC | Age: 32
End: 2022-04-15

## 2022-04-15 VITALS
DIASTOLIC BLOOD PRESSURE: 103 MMHG | HEIGHT: 76 IN | HEART RATE: 62 BPM | TEMPERATURE: 97.5 F | WEIGHT: 243 LBS | OXYGEN SATURATION: 98 % | SYSTOLIC BLOOD PRESSURE: 139 MMHG | RESPIRATION RATE: 20 BRPM | BODY MASS INDEX: 29.59 KG/M2

## 2022-04-15 DIAGNOSIS — M51.26 DISPLACEMENT OF LUMBAR INTERVERTEBRAL DISC WITHOUT MYELOPATHY: Primary | ICD-10-CM

## 2022-04-15 DIAGNOSIS — M54.16 LUMBAR RADICULOPATHY: ICD-10-CM

## 2022-04-15 PROCEDURE — 99214 OFFICE O/P EST MOD 30 MIN: CPT | Performed by: NURSE PRACTITIONER

## 2022-04-15 RX ORDER — IBUPROFEN 800 MG/1
800 TABLET ORAL EVERY 8 HOURS
COMMUNITY
Start: 2022-03-21 | End: 2022-10-17

## 2022-04-15 NOTE — PROGRESS NOTES
CHIEF COMPLAINT  F/u back pain. Pt sts pain has worsened since last ov.     Subjective   Javier Arriola is a 31 y.o. male  who presents for follow-up.  He has a history of back pain.  Today his pain is 8/10VAS in severity.  At his last office visit we increased his Lyrica to 200 mg TID.  He is also on Norco 5/325 1-2/day. He does states there was improvement with increasing his Lyrica.  This medication regimen decreases his pain by 70% for a couple hours.    He is currently off work right now due to his pain. Dr. Lozada is planning to move forward with surgery, patient is unfortunately still waiting on Worker's Comp. for approval.    Procedure list:  11/10/2021-LESI at L5-S1-80% relief x2 weeks   Unfortunately Worker's Comp. has not approved any further epidurals.     Patient remained masked during entire encounter. No cough present. I donned a mask and eye protection throughout entire visit. Prior to donning mask and eye protection, hand hygiene was performed, as well as when it was doffed.  I was closer than 6 feet, but not for an extended period of time. No obvious exposure to any bodily fluids.    Back Pain  This is a chronic (Injured on March 17, 2021) problem. The current episode started more than 1 month ago. The problem occurs constantly. The problem has been gradually worsening since onset. The pain is present in the lumbar spine. The quality of the pain is described as burning and aching. The pain radiates to the left thigh and left foot (tingling). The pain is at a severity of 8/10. The pain is the same all the time. The symptoms are aggravated by bending and standing (lifting, increased activity, walking). Associated symptoms include numbness (left leg). Pertinent negatives include no abdominal pain, chest pain, dysuria, fever, headaches or weakness. He has tried NSAIDs and heat (Lyrica, Norco, TENS, PT) for the symptoms.      PEG Assessment   What number best describes your pain on average in the past  "week?7  What number best describes how, during the past week, pain has interfered with your enjoyment of life?7  What number best describes how, during the past week, pain has interfered with your general activity?  7    The following portions of the patient's history were reviewed and updated as appropriate: allergies, current medications, past family history, past medical history, past social history, past surgical history and problem list.    Review of Systems   Constitutional: Positive for activity change (dec) and fatigue (occ). Negative for fever.   HENT: Negative for congestion.    Eyes: Negative for visual disturbance.   Respiratory: Negative for cough and shortness of breath.    Cardiovascular: Negative for chest pain.   Gastrointestinal: Negative for abdominal pain, constipation and diarrhea.   Genitourinary: Negative for difficulty urinating and dysuria.   Musculoskeletal: Positive for back pain.   Neurological: Positive for numbness (left leg). Negative for dizziness, weakness, light-headedness and headaches.   Psychiatric/Behavioral: Positive for sleep disturbance (occ). Negative for agitation and suicidal ideas. The patient is not nervous/anxious.      --  The aforementioned information the Chief Complaint section and above subjective data including any HPI data, and also the Review of Systems data, has been personally reviewed and affirmed.  --    Vitals:    04/15/22 1349 04/15/22 1356   BP: (!) 142/101  Comment: pt sts had bp med today (!) 139/103   Pulse: 62    Resp: 20    Temp: 97.5 °F (36.4 °C)    SpO2: 98%    Weight: 110 kg (243 lb)    Height: 193 cm (76\")    PainSc:   7    PainLoc: Back      Objective   Physical Exam  Vitals and nursing note reviewed.   Constitutional:       Appearance: Normal appearance. He is well-developed.   Eyes:      General: Lids are normal.   Cardiovascular:      Rate and Rhythm: Normal rate.   Pulmonary:      Effort: Pulmonary effort is normal.   Musculoskeletal:      " Cervical back: Normal range of motion.      Lumbar back: Tenderness present. Decreased range of motion.   Neurological:      Mental Status: He is alert and oriented to person, place, and time.   Psychiatric:         Attention and Perception: Attention normal.         Mood and Affect: Mood normal.         Speech: Speech normal.         Behavior: Behavior normal.         Judgment: Judgment normal.       Assessment/Plan   Diagnoses and all orders for this visit:    1. Displacement of lumbar intervertebral disc without myelopathy (Primary)  -     Ambulatory Referral to Physical Therapy Evaluate and treat    2. Lumbar radiculopathy  -     Ambulatory Referral to Physical Therapy Evaluate and treat      --- The oral drug screen confirmation from 2/11/2022 has been reviewed and the result is appropriate based on patient history and HERBERT report  --- CSA updated 2/11/2022  --- Trial Compounded pain cream   --- Continue Norco 5/325. No refill needed. (Next refill is not due until 5/13/2022). Patient appears stable with current regimen. No adverse effects. Regarding continuation of opioids, there is no evidence of aberrant behavior or any red flags.  The patient continues with appropriate response to opioid therapy. ADL's remain intact by self.   --- Continue Lyrica 200 mg TID  --- Return to physical therapy as this has provided some temporary relief in the past.   --- Follow-up 2 months or sooner if needed.      HERBERT REPORT  As part of the patient's treatment plan, I am prescribing controlled substances. The patient has been made aware of appropriate use of such medications, including potential risk of somnolence, limited ability to drive and/or work safely, and the potential for dependence or overdose. It has also bee made clear that these medications are for use by this patient only, without concomitant use of alcohol or other substances unless prescribed.     Patient has completed prescribing agreement detailing terms  of continued prescribing of controlled substances, including monitoring HERBERT reports, urine drug screening, and pill counts if necessary. The patient is aware that inappropriate use will results in cessation of prescribing such medications.    As the clinician, I personally reviewed the HERBERT from 4/15/2022 while the patient was in the office today.    History and physical exam exhibit continued safe and appropriate use of controlled substances.    Dictated utilizing Dragon dictation.     This document is intended for medical expert use only. Reading of this document by patients and/or patient's family without participating medical staff guidance may result in misinterpretation and unintended morbidity.   Any interpretation of such data is the responsibility of the patient and/or family member responsible for the patient in concert with their primary or specialist providers, not to be left for sources of online searches such as [employer], [employer] or similar queries. Relying on these approaches to knowledge may result in misinterpretation, misguided goals of care and even death should patients or family members try recommendations outside of the realm of professional medical care in a supervised way.

## 2022-04-21 ENCOUNTER — TELEPHONE (OUTPATIENT)
Dept: ORTHOPEDIC SURGERY | Facility: CLINIC | Age: 32
End: 2022-04-21

## 2022-04-21 NOTE — TELEPHONE ENCOUNTER
I CALLED THE PATIENT AND HE IS GOING TO SEE AN ATTY ON 05-03-22, THEY FEEL THEY CAN GET W/C MOVING ON HIS CASE, HE WILL CALL AND LET ME KNOW WHAT HE FINDS OUT,LLR

## 2022-05-06 RX ORDER — HYDROCODONE BITARTRATE AND ACETAMINOPHEN 5; 325 MG/1; MG/1
1 TABLET ORAL EVERY 12 HOURS PRN
Qty: 60 TABLET | Refills: 0 | Status: SHIPPED | OUTPATIENT
Start: 2022-05-06 | End: 2022-06-06 | Stop reason: SDUPTHER

## 2022-05-06 RX ORDER — PREGABALIN 200 MG/1
200 CAPSULE ORAL 3 TIMES DAILY
Qty: 90 CAPSULE | Refills: 0 | Status: SHIPPED | OUTPATIENT
Start: 2022-05-06 | End: 2022-06-06 | Stop reason: SDUPTHER

## 2022-05-24 ENCOUNTER — TREATMENT (OUTPATIENT)
Dept: PHYSICAL THERAPY | Facility: CLINIC | Age: 32
End: 2022-05-24

## 2022-05-24 DIAGNOSIS — R29.898 WEAKNESS OF BOTH HIPS: ICD-10-CM

## 2022-05-24 DIAGNOSIS — R26.2 DIFFICULTY WALKING: ICD-10-CM

## 2022-05-24 DIAGNOSIS — M54.16 LUMBAR BACK PAIN WITH RADICULOPATHY AFFECTING LEFT LOWER EXTREMITY: Primary | ICD-10-CM

## 2022-05-24 PROCEDURE — 97161 PT EVAL LOW COMPLEX 20 MIN: CPT | Performed by: PHYSICAL THERAPIST

## 2022-05-24 PROCEDURE — 97110 THERAPEUTIC EXERCISES: CPT | Performed by: PHYSICAL THERAPIST

## 2022-05-24 NOTE — PROGRESS NOTES
Physical Therapy Initial Evaluation and Plan of Care        Patient: Javier Arriola   : 1990  Diagnosis/ICD-10 Code:  Lumbar back pain with radiculopathy affecting left lower extremity [M54.16]  Referring practitioner: CELI Rey  Date of Initial Visit: 2022  Today's Date: 2022  Patient seen for 1 sessions           Subjective Questionnaire: Oswestry: 2950      Subjective Evaluation    History of Present Illness  Mechanism of injury: Pt reports that he injured his back on 3/17/21.  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  Pt reports that he had to lift up to 60 lbs.  Pt reports that on the day of his injury he was pushing a heavy pallet down a ramp and the dolley went out of control and he tried to slow it and was pushed up against a rail.  Pt reports that the pallet was about 2000 lbs.  Pt reports that he felt a sharp pain in his lower back.  Pt reports that he felt numbness in the L LE that went down to his toes.  Pt reports that pain and radicular symptoms have gotten worse.  Pt reports that his L LE occasionally ashley.  Pt reports that standing, walking, bending, sit to/from stand transfers and lifting activities increases his pain and L LE radicular pain.  Pt reports that he has difficulty sleeping due to pain.  Pt reports that he is taking hydrocodone and lyrica for pain control with limited pain relief.  Pt reports that he is not working currently.  Pt has seen a spinal surgeon who states that he needs sx but he has yet to get insurance approval at this time.     Pain  Current pain ratin  At best pain ratin  At worst pain rating: 10  Quality: dull ache, tight, pulling and discomfort  Relieving factors: rest and medications  Aggravating factors: ambulation, squatting, lifting, stairs, prolonged positioning, repetitive movement,  sleeping, standing and movement  Progression: worsening    Social Support  Lives in: one-story house  Lives with: spouse and young children    Diagnostic Tests  MRI studies: abnormal    Treatments  Previous treatment: physical therapy  Patient Goals  Patient goals for therapy: decreased pain, increased motion, increased strength, independence with ADLs/IADLs, return to sport/leisure activities and return to work           Lumbar spine MRI results:  1. Small disc protrusion at L5-S1 with a annular tear along the left neural foraminal   component of the disc protrusion. There is mild facet arthropathy. No central canal   stenosis, however there is mild bilateral neural foraminal stenosis.      Objective          Static Posture     Head  Forward.    Shoulders  Rounded.    Scapulae  Left protracted and right protracted.    Thoracic Spine  Hyperkyphosis.    Lumbar Spine   Increased lordosis.     Hip   Hip (Left): Externally rotated.     Comments  Rib hump on R during forward bend test    Palpation   Left   Tenderness of the erector spinae and lumbar paraspinals.     Right Tenderness of the erector spinae and lumbar paraspinals.     Neurological Testing     Sensation     Lumbar   Left   Diminished: light touch    Right   Intact: light touch    Comments   Left light touch: decreased sensation L2-L5 in L LE  Right light touch: R LE light touch sensation intact    Active Range of Motion     Additional Active Range of Motion Details  Lumbar AROM:  Flexion: 75% limited (pain, worse than extension)  Extension: 75% limited (pain)  Lateral flexion: 75% limited bilaterally, pain bilaterally  Rotation: 75% limited bilaterally, pain bilaterally    Strength/Myotome Testing     Left Hip   Planes of Motion   Flexion: 4-  Extension: 4-  Abduction: 4-    Right Hip   Planes of Motion   Flexion: 4-  Extension: 4-  Abduction: 4-    Left Knee   Flexion: 4-  Extension: 4-    Right Knee   Flexion: 4+  Extension: 4+    Left Ankle/Foot    Dorsiflexion: 4-    Right Ankle/Foot   Dorsiflexion: 4+    Tests     Lumbar     Left   Positive passive SLR and quadrant.     Right   Negative passive SLR and quadrant.     Additional Tests Details  Lumbar compression: positive      Ambulation     Observational Gait   Gait: antalgic   Decreased walking speed, left stance time and left step length.     Additional Observational Gait Details  Pt demonstrates right lateral lean, decreased stance time, push off and heel strike on L LE during gait.      General Comments     Lumbar Comments  Significant hamstring/pifiromis tightness noted bilaterally, worse on L         Assessment & Plan     Assessment  Impairments: abnormal gait, abnormal muscle firing, abnormal or restricted ROM, activity intolerance, impaired balance, impaired physical strength, lacks appropriate home exercise program, pain with function, safety issue and weight-bearing intolerance  Functional Limitations: lifting, sleeping, walking, pulling, pushing, uncomfortable because of pain, moving in bed, sitting, standing, stooping and unable to perform repetitive tasks  Assessment details: Patient presents with signs/symptoms consistent with lower back pain with L LE radicular pain/symptoms including decreased lumbar ROM, bilateral hip, core and L LE weakness and reports of pain/radicular symptoms limiting function during ADLs as shown on the RICCO.  Patient would benefit from skilled PT services in order to address remaining deficits limiting function at this time.    Prognosis: good    Goals  Plan Goals: 1. The patient complains of low back pain.  LTG 1: 12 weeks:  The patient will report a pain rating of 2/10 or better in order to improve  tolerance to activities of daily living and improve sleep quality.  STATUS:  New  STG 1a: 6 weeks:  The patient will report a pain rating of 4/10 or better.  STATUS:  New  TREATMENT:  Therapeutic exercises, manual therapy, aquatic therapy, home exercise   instruction, and  modalities as needed for pain to include:  electrical stimulation, moist heat, ice,   ultrasound, and diathermy.      2. The patient demonstrates weakness of the bilateral hips.  LTG 2: 12 weeks:  The patient will demonstrate 4+ /5 strength for bilateral hip flexion, abduction,  and extension in order to improve hip stability.  STATUS:  New  STG 2a: 6 weeks:  The patient will demonstrate 4 /5 strength for bilateral hip flexion, abduction,  and extension.  STATUS:  New  TREATMENT: Therapeutic exercises, manual therapy, aquatic therapy, home exercise instruction,  and modalities as needed for pain to include:  electrical stimulation, moist heat, ice, ultrasound, and   diathermy.      3. The patient has gait dysfunction.  LTG 3: 12 weeks:  The patient will ambulate without assistive device, independently, for   community distances with minimal limp in order to improve mobility and allow   patient to perform activities such as grocery shopping with greater ease.  STATUS:  New  TREATMENT: Gait training, aquatic therapy, therapeutic exercise, and home exercise instruction.    4. Mobility: Walking/Moving Around Functional Limitation    LTG 1: 12 weeks:  The patient will demonstrate 20 % limitation by achieving a score of 10/50 on the RICCO.  STATUS:  New  TREATMENT:  Manual therapy, therapeutic exercise, home exercise instruction, and modalities as needed to include: moist heat, electrical stimulation, and ultrasound.    Plan  Therapy options: will be seen for skilled therapy services  Planned modality interventions: cryotherapy, electrical stimulation/Russian stimulation, TENS, traction and dry needling  Planned therapy interventions: balance/weight-bearing training, ADL retraining, soft tissue mobilization, strengthening, stretching, therapeutic activities, joint mobilization, home exercise program, gait training, functional ROM exercises, flexibility, body mechanics training, postural training, neuromuscular re-education,  manual therapy and spinal/joint mobilization  Frequency: 3x week (2-3 times a week)  Duration in weeks: 12  Treatment plan discussed with: patient        Timed:         Manual Therapy:    0     mins  18473;     Therapeutic Exercise:    8     mins  43656;     Neuromuscular Svetlana:    0    mins  70065;    Therapeutic Activity:     0     mins  91764;     Gait Trainin     mins  37707;     Ultrasound:     0     mins  20555;    Ionto                               0    mins   29936  Self-care  __0__ mins 92177    Un-Timed:  Electrical Stimulation:    0     mins  69883 ( );  Traction     0     mins 14208  Low Eval     30     Mins  08714  Mod Eval     0     Mins  28230  High Eval                       0     Mins  22131  Hot pack     0     Mins    Cold pack                       0     Mins      Timed Treatment:   8   mins   Total Treatment:     38   mins    PT SIGNATURE: Ana Laura Ireland PT      Electronically signed 2022  [place] License: [id number]    Initial Certification    Certification Period: 2022 thru 2022  NPI: [id number]  I certify that the therapy services are furnished while this patient is under my care.  The services outlined above are required by this patient, and will be reviewed every 90 days.     PHYSICIAN: Ninoska Dunlap APRN      DATE:     Please sign and return via fax to [phone].. Thank you, [facility].

## 2022-05-26 ENCOUNTER — TREATMENT (OUTPATIENT)
Dept: PHYSICAL THERAPY | Facility: CLINIC | Age: 32
End: 2022-05-26

## 2022-05-26 DIAGNOSIS — M54.16 LUMBAR BACK PAIN WITH RADICULOPATHY AFFECTING LEFT LOWER EXTREMITY: ICD-10-CM

## 2022-05-26 DIAGNOSIS — R29.898 WEAKNESS OF BOTH HIPS: ICD-10-CM

## 2022-05-26 DIAGNOSIS — M54.50 LUMBAR PAIN: ICD-10-CM

## 2022-05-26 DIAGNOSIS — R26.2 DIFFICULTY WALKING: Primary | ICD-10-CM

## 2022-05-26 PROCEDURE — 97012 MECHANICAL TRACTION THERAPY: CPT | Performed by: PHYSICAL THERAPIST

## 2022-05-26 PROCEDURE — 97110 THERAPEUTIC EXERCISES: CPT | Performed by: PHYSICAL THERAPIST

## 2022-05-26 NOTE — PROGRESS NOTES
Physical Therapy Daily Treatment Note      Patient: Javier Arriola   : 1990  Referring practitioner: No ref. provider found  Date of Initial Visit: Type: THERAPY  Noted: 2022  Today's Date: 2022  Patient seen for 2 sessions           Subjective Questionnaire:       Subjective Evaluation    History of Present Illness    Subjective comment: Pt reported contnued low back pain.         Objective   See Exercise, Manual, and Modality Logs for complete treatment.       Assessment & Plan     Assessment    Assessment details: Pt did not feel stretch at piriformis stating he felt it in his low back.  Pt reported he feels a release of back pain with traction.        Visit Diagnoses:    ICD-10-CM ICD-9-CM   1. Difficulty walking  R26.2 719.7   2. Lumbar back pain with radiculopathy affecting left lower extremity  M54.16 724.4   3. Weakness of both hips  R29.898 729.89   4. Lumbar pain  M54.50 724.2       Progress per Plan of Care and Progress strengthening /stabilization /functional activity           Timed:  Manual Therapy:         mins  71012;  Therapeutic Exercise:    10     mins  14759;     Neuromuscular Svetlana:        mins  92193;    Therapeutic Activity:          mins  74836;     Gait Training:           mins  09620;     Ultrasound:          mins  49352;    Electrical Stimulation:         mins  39164 ( );  Aquatic Therapy          mins  81632    Untimed:  Electrical Stimulation:         mins  75560 ( );  Mechanical Traction:    15     mins  48060;     Timed Treatment:   10   mins   Total Treatment:     25   mins    Electronically signed    Liseth Spears PTA  Physical Therapist Assistant    [place] license: [id number]

## 2022-06-03 ENCOUNTER — TELEPHONE (OUTPATIENT)
Dept: PAIN MEDICINE | Facility: CLINIC | Age: 32
End: 2022-06-03

## 2022-06-03 NOTE — TELEPHONE ENCOUNTER
Called [facility] peer review services on 6/2/22 to request a peer to peer review phone call for Ninoska. I was told Ninoska will receive a call on 6/3/22 at 0800. We never received a call on 6/3/22. I called them back on 6/3/22 to reschedule the phone call, they rescheduled it to 1030am. Again, Ninoska never received a phone call. [facility] called today at 1330 to tell me the request for peer review has been entered and it takes 5 business days to have the doctor call us back. Call has been rescheduled again to next Monday 6/6/22 at 0800. Please advise. Thank you.

## 2022-06-06 RX ORDER — PREGABALIN 200 MG/1
200 CAPSULE ORAL 3 TIMES DAILY
Qty: 90 CAPSULE | Refills: 0 | Status: SHIPPED | OUTPATIENT
Start: 2022-06-06 | End: 2022-07-05 | Stop reason: SDUPTHER

## 2022-06-06 RX ORDER — HYDROCODONE BITARTRATE AND ACETAMINOPHEN 5; 325 MG/1; MG/1
1 TABLET ORAL EVERY 12 HOURS PRN
Qty: 60 TABLET | Refills: 0 | Status: SHIPPED | OUTPATIENT
Start: 2022-06-06 | End: 2022-07-05 | Stop reason: SDUPTHER

## 2022-06-08 ENCOUNTER — TELEPHONE (OUTPATIENT)
Dept: PHYSICAL THERAPY | Facility: OTHER | Age: 32
End: 2022-06-08

## 2022-06-10 ENCOUNTER — TREATMENT (OUTPATIENT)
Dept: PHYSICAL THERAPY | Facility: CLINIC | Age: 32
End: 2022-06-10

## 2022-06-10 DIAGNOSIS — M54.50 LUMBAR PAIN: ICD-10-CM

## 2022-06-10 DIAGNOSIS — R29.898 WEAKNESS OF BOTH HIPS: ICD-10-CM

## 2022-06-10 DIAGNOSIS — R26.2 DIFFICULTY WALKING: Primary | ICD-10-CM

## 2022-06-10 DIAGNOSIS — M54.16 LUMBAR BACK PAIN WITH RADICULOPATHY AFFECTING LEFT LOWER EXTREMITY: ICD-10-CM

## 2022-06-10 PROCEDURE — 97140 MANUAL THERAPY 1/> REGIONS: CPT | Performed by: PHYSICAL THERAPIST

## 2022-06-10 PROCEDURE — 97110 THERAPEUTIC EXERCISES: CPT | Performed by: PHYSICAL THERAPIST

## 2022-06-10 NOTE — PROGRESS NOTES
Physical Therapy Daily Progress Note      Patient: Javier Arriola   : 1990  Diagnosis/ICD-10 Code:  Difficulty walking [R26.2]  Referring practitioner: CELI Rey  Date of Initial Visit: Type: THERAPY  Noted: 2022  Today's Date: 6/10/2022  Patient seen for 3 sessions             Subjective   Javier Arriola reports: 8/10 lower back and L LE numbness to his toes.  Pt reports his entire L foot will go numb with prolonged walking tasks. Pt reports mechanical traction increased pain significantly after last session.     Objective   Pt demonstrates significant sciatic nerve tightness  See Exercise, Manual, and Modality Logs for complete treatment.       Assessment/Plan  Patient tolerated all exercise progressions and manual therapy moderately well today.  Pt limited in exercise progressions/manual therapy tolerance due to reports of pain and L LE radicular symptoms.  Mechanical traction held due to increased pain last session.  Continue to progress per patient tolerance.    Progress per Plan of Care           Timed:  Manual Therapy:    8     mins  53207;  Therapeutic Exercise:    15     mins  40284;     Neuromuscular Svetlana:    0    mins  38420;    Therapeutic Activity:     0     mins  22235;     Gait Trainin     mins  28713;     Ultrasound:     0     mins  09929;    Electrical Stimulation:    0     mins  96199;  Iontophoresis     0     mins  89224    Untimed:  Electrical Stimulation:    0     mins  76271 ( );  Mechanical Traction:    0     mins  08025;   Fluidotherapy     0     mins  32525  Hot pack     0     Mins    Cold pack                       0     Mins     Timed Treatment:   23   mins   Total Treatment:     23   mins        Ana Laura Ireland PT    Electronically signed [unfilled]  [place] License: [id number]  NPI number: [id number]

## 2022-06-15 ENCOUNTER — TELEPHONE (OUTPATIENT)
Dept: PHYSICAL THERAPY | Facility: CLINIC | Age: 32
End: 2022-06-15

## 2022-06-15 ENCOUNTER — OFFICE VISIT (OUTPATIENT)
Dept: PAIN MEDICINE | Facility: CLINIC | Age: 32
End: 2022-06-15

## 2022-06-15 VITALS
HEART RATE: 74 BPM | TEMPERATURE: 97.7 F | DIASTOLIC BLOOD PRESSURE: 99 MMHG | OXYGEN SATURATION: 98 % | BODY MASS INDEX: 29.71 KG/M2 | HEIGHT: 76 IN | RESPIRATION RATE: 20 BRPM | WEIGHT: 244 LBS | SYSTOLIC BLOOD PRESSURE: 142 MMHG

## 2022-06-15 DIAGNOSIS — M54.16 LUMBAR RADICULOPATHY: Primary | ICD-10-CM

## 2022-06-15 DIAGNOSIS — Z79.899 ENCOUNTER FOR LONG-TERM (CURRENT) USE OF HIGH-RISK MEDICATION: ICD-10-CM

## 2022-06-15 DIAGNOSIS — M51.26 DISPLACEMENT OF LUMBAR INTERVERTEBRAL DISC WITHOUT MYELOPATHY: ICD-10-CM

## 2022-06-15 PROCEDURE — 99213 OFFICE O/P EST LOW 20 MIN: CPT | Performed by: NURSE PRACTITIONER

## 2022-06-15 PROCEDURE — 80305 DRUG TEST PRSMV DIR OPT OBS: CPT | Performed by: NURSE PRACTITIONER

## 2022-06-15 RX ORDER — CYCLOBENZAPRINE HCL 5 MG
5 TABLET ORAL 2 TIMES DAILY PRN
Qty: 60 TABLET | Refills: 0 | Status: SHIPPED | OUTPATIENT
Start: 2022-06-15 | End: 2022-07-05 | Stop reason: SDUPTHER

## 2022-06-15 NOTE — PROGRESS NOTES
CHIEF COMPLAINT  F/u back pain. Pt sts pain is the same, based on activity.    Subjective   Javier Arriola is a 31 y.o. male  who presents for follow-up.  He has a history of back pain.  Today his pain is 7/10VAS in severity. He has seen improvement with the increase in his Lyrica to TID. He is utilizing Norco 5/325 BID. This regimen decreases his pain by 50-60%. ADLs by mostly by self, he has trouble with activities that require any bending or lifting. His pain is also worsened by walking. He states that he is having muscle spasms in his left leg as well.     Compounded pain cream--Not helpful, He has seen no relief with PT, He does utilize a TENS unit, this is only helpful if he is sedentary. He is also using heat and massage. He has purchased an inversion table which has helped some.     Worker's comp denies a second LESI and has also denied the surgery recommended by Dr. Lozada.  He is now working with an .     Procedure list:  11/10/2021-LESI at L5-S1-80% relief x2 weeks   Unfortunately Worker's Comp. has not approved any further epidurals.     Patient remained masked during entire encounter. No cough present. I donned a mask and eye protection throughout entire visit. Prior to donning mask and eye protection, hand hygiene was performed, as well as when it was doffed.  I was closer than 6 feet, but not for an extended period of time. No obvious exposure to any bodily fluids.    Back Pain  This is a chronic (Injured on March 17, 2021) problem. The current episode started more than 1 month ago. The problem occurs constantly. The problem is unchanged. The pain is present in the lumbar spine. The quality of the pain is described as burning and aching. The pain radiates to the left thigh and left foot (tingling). The pain is at a severity of 7/10. The pain is the same all the time. The symptoms are aggravated by bending and standing (lifting, increased activity, walking). Pertinent negatives include no  "abdominal pain, chest pain, dysuria, fever, headaches, numbness or weakness. He has tried NSAIDs and heat (Lyrica, Norco, TENS, PT) for the symptoms.      PEG Assessment   What number best describes your pain on average in the past week?7  What number best describes how, during the past week, pain has interfered with your enjoyment of life?7  What number best describes how, during the past week, pain has interfered with your general activity?  7    The following portions of the patient's history were reviewed and updated as appropriate: allergies, current medications, past family history, past medical history, past social history, past surgical history and problem list.    Review of Systems   Constitutional: Negative for activity change, fatigue and fever.   HENT: Negative for congestion.    Eyes: Negative for visual disturbance.   Respiratory: Negative for cough and shortness of breath.    Cardiovascular: Negative for chest pain.   Gastrointestinal: Negative for abdominal pain, constipation and diarrhea.   Genitourinary: Negative for difficulty urinating and dysuria.   Musculoskeletal: Positive for back pain.   Neurological: Negative for dizziness, weakness, light-headedness, numbness and headaches.   Psychiatric/Behavioral: Negative for agitation, sleep disturbance and suicidal ideas. The patient is not nervous/anxious.      --  The aforementioned information the Chief Complaint section and above subjective data including any HPI data, and also the Review of Systems data, has been personally reviewed and affirmed.  --    Vitals:    06/15/22 1408   BP: 142/99  Comment: pt sts had bp meds today   Pulse: 74   Resp: 20   Temp: 97.7 °F (36.5 °C)   SpO2: 98%   Weight: 111 kg (244 lb)   Height: 193 cm (76\")   PainSc:   7   PainLoc: Back     Objective   Physical Exam  Vitals and nursing note reviewed.   Constitutional:       Appearance: Normal appearance. He is well-developed.   Eyes:      General: Lids are normal. "   Cardiovascular:      Rate and Rhythm: Normal rate.   Pulmonary:      Effort: Pulmonary effort is normal.   Musculoskeletal:      Lumbar back: Tenderness present. Decreased range of motion.   Neurological:      Mental Status: He is alert and oriented to person, place, and time.   Psychiatric:         Attention and Perception: Attention normal.         Mood and Affect: Mood normal.         Speech: Speech normal.         Behavior: Behavior normal.         Judgment: Judgment normal.       Assessment & Plan   Diagnoses and all orders for this visit:    1. Lumbar radiculopathy (Primary)  -     Urine Drug Screen Confirmation - Urine, Clean Catch; Future  -     POC Urine Drug Screen, Triage    2. Displacement of lumbar intervertebral disc without myelopathy    3. Encounter for long-term (current) use of high-risk medication    Other orders  -     cyclobenzaprine (FLEXERIL) 5 MG tablet; Take 1 tablet by mouth 2 (Two) Times a Day As Needed for Muscle Spasms.  Dispense: 60 tablet; Refill: 0      --- Routine UDS in office today as part of monitoring requirements for controlled substances.  The specimen was viewed and the immunoassay result reviewed and is +OPI.  This specimen will be sent to [facility] laboratory for confirmation.     --- CSA updated 2/11/2022  --- Trial Flexeril 5 mg BID PRN for muscle spasms  --- Continue Norco 5/325. No refill needed. Patient appears stable with current regimen. No adverse effects. Regarding continuation of opioids, there is no evidence of aberrant behavior or any red flags.  The patient continues with appropriate response to opioid therapy. ADL's remain intact by self.   --- Continue Lyrica 200 mg TID  --- OK to stop PT as this has aggravated his pain.   --- Follow-up 2 months or sooner if needed.      HERBERT REPORT  As part of the patient's treatment plan, I am prescribing controlled substances. The patient has been made aware of appropriate use of such medications, including potential  risk of somnolence, limited ability to drive and/or work safely, and the potential for dependence or overdose. It has also been made clear that these medications are for use by this patient only, without concomitant use of alcohol or other substances unless prescribed.     Patient has completed prescribing agreement detailing terms of continued prescribing of controlled substances, including monitoring HERBERT reports, urine drug screening, and pill counts if necessary. The patient is aware that inappropriate use will results in cessation of prescribing such medications.    As the clinician, I personally reviewed the HERBERT from 6/15/2022 while the patient was in the office today.    History and physical exam exhibit continued safe and appropriate use of controlled substances.    Dictated utilizing Dragon dictation.     This document is intended for medical expert use only. Reading of this document by patients and/or patient's family without participating medical staff guidance may result in misinterpretation and unintended morbidity.   Any interpretation of such data is the responsibility of the patient and/or family member responsible for the patient in concert with their primary or specialist providers, not to be left for sources of online searches such as hetras, Viewglass or similar queries. Relying on these approaches to knowledge may result in misinterpretation, misguided goals of care and even death should patients or family members try recommendations outside of the realm of professional medical care in a supervised way.

## 2022-07-05 RX ORDER — CYCLOBENZAPRINE HCL 5 MG
5 TABLET ORAL 2 TIMES DAILY PRN
Qty: 60 TABLET | Refills: 0 | Status: SHIPPED | OUTPATIENT
Start: 2022-07-05 | End: 2022-08-01 | Stop reason: SDUPTHER

## 2022-07-05 RX ORDER — HYDROCODONE BITARTRATE AND ACETAMINOPHEN 5; 325 MG/1; MG/1
1 TABLET ORAL EVERY 12 HOURS PRN
Qty: 60 TABLET | Refills: 0 | Status: SHIPPED | OUTPATIENT
Start: 2022-07-05 | End: 2022-08-08 | Stop reason: SDUPTHER

## 2022-07-05 RX ORDER — PREGABALIN 200 MG/1
200 CAPSULE ORAL 3 TIMES DAILY
Qty: 90 CAPSULE | Refills: 0 | Status: SHIPPED | OUTPATIENT
Start: 2022-07-05 | End: 2022-08-08 | Stop reason: SDUPTHER

## 2022-07-05 NOTE — TELEPHONE ENCOUNTER
Reviewed Ninoska ALATORRE last office visit on 6/15/2022. CATHERINE and HERBERT reviewed and are appropriate. Due to provider being out of office, will refill appropriately.

## 2022-07-12 ENCOUNTER — TELEPHONE (OUTPATIENT)
Dept: PAIN MEDICINE | Facility: CLINIC | Age: 32
End: 2022-07-12

## 2022-07-12 NOTE — TELEPHONE ENCOUNTER
Yes, do not take the Norco prescribed by our office while taking what he is prescribed by the ED. Thanks

## 2022-07-12 NOTE — TELEPHONE ENCOUNTER
Called and spoke to pt. He sts he has a history of frequent kidney stones and thinks he has one now. He plans on heading to [facility] in [facility]. He wanted to make you aware and ask you if he is prescribed pain meds this evening, can he fill them? Please advise. Thank you.

## 2022-07-12 NOTE — TELEPHONE ENCOUNTER
Caller: PATIENT    Relationship to patient: SELF     Best call back number: [phone]    Patient is needing: PATIENT WAS CALLING TO SPEAK TO THE OFFICE ABOUT THE PAIN HE IS HAVING BECAUSE HE IS PLANNING ON GOING TO THE ER. PATIENT IS THINKING HE HAS A KIDNEY STONE(S). PATIENT WAS ADVISED IF HE EVER GOES TO THE ER AND THEY PRESCRIBE PAIN MEDICATION TO LET OUR OFFICE KNOW BEFORE FILLING THE MEDICATION. PATIENT THINKS THEY ARE GOING TO PRESCRIBE HIM MEDICATION SO HE WANTED TO VERIFY WITH YOUR OFFICE PRIOR TO GOING TO THE ER THAT IT WAS OKAY. I TRIED TO WARM TRANSFER CALL TO THE CLINICAL STAFF BUT RECEIVED NO ANSWER. THANK YOU!

## 2022-08-01 RX ORDER — CYCLOBENZAPRINE HCL 5 MG
TABLET ORAL
Qty: 60 TABLET | Refills: 0 | OUTPATIENT
Start: 2022-08-01

## 2022-08-01 RX ORDER — CYCLOBENZAPRINE HCL 5 MG
5 TABLET ORAL 2 TIMES DAILY PRN
Qty: 60 TABLET | Refills: 0 | Status: SHIPPED | OUTPATIENT
Start: 2022-08-01 | End: 2022-08-15 | Stop reason: DRUGHIGH

## 2022-08-08 RX ORDER — PREGABALIN 200 MG/1
200 CAPSULE ORAL 3 TIMES DAILY
Qty: 90 CAPSULE | Refills: 0 | Status: SHIPPED | OUTPATIENT
Start: 2022-08-08 | End: 2022-09-02 | Stop reason: SDUPTHER

## 2022-08-08 RX ORDER — HYDROCODONE BITARTRATE AND ACETAMINOPHEN 5; 325 MG/1; MG/1
1 TABLET ORAL EVERY 12 HOURS PRN
Qty: 60 TABLET | Refills: 0 | Status: SHIPPED | OUTPATIENT
Start: 2022-08-08 | End: 2022-09-02 | Stop reason: SDUPTHER

## 2022-08-15 ENCOUNTER — TELEPHONE (OUTPATIENT)
Dept: PAIN MEDICINE | Facility: CLINIC | Age: 32
End: 2022-08-15

## 2022-08-15 ENCOUNTER — OFFICE VISIT (OUTPATIENT)
Dept: PAIN MEDICINE | Facility: CLINIC | Age: 32
End: 2022-08-15

## 2022-08-15 VITALS
HEIGHT: 76 IN | HEART RATE: 99 BPM | TEMPERATURE: 98 F | DIASTOLIC BLOOD PRESSURE: 70 MMHG | SYSTOLIC BLOOD PRESSURE: 130 MMHG | OXYGEN SATURATION: 97 % | BODY MASS INDEX: 30.98 KG/M2 | WEIGHT: 254.4 LBS | RESPIRATION RATE: 12 BRPM

## 2022-08-15 DIAGNOSIS — M54.16 LUMBAR RADICULOPATHY: Primary | ICD-10-CM

## 2022-08-15 DIAGNOSIS — Z79.899 ENCOUNTER FOR LONG-TERM (CURRENT) USE OF HIGH-RISK MEDICATION: ICD-10-CM

## 2022-08-15 DIAGNOSIS — M51.26 DISPLACEMENT OF LUMBAR INTERVERTEBRAL DISC WITHOUT MYELOPATHY: ICD-10-CM

## 2022-08-15 PROCEDURE — 99214 OFFICE O/P EST MOD 30 MIN: CPT

## 2022-08-15 RX ORDER — CYCLOBENZAPRINE HCL 10 MG
10 TABLET ORAL 2 TIMES DAILY PRN
Qty: 60 TABLET | Refills: 0 | Status: SHIPPED | OUTPATIENT
Start: 2022-08-15 | End: 2022-08-15 | Stop reason: SDUPTHER

## 2022-08-15 RX ORDER — CYCLOBENZAPRINE HCL 10 MG
10 TABLET ORAL 2 TIMES DAILY PRN
Qty: 60 TABLET | Refills: 0 | Status: SHIPPED | OUTPATIENT
Start: 2022-08-15 | End: 2022-09-02 | Stop reason: SDUPTHER

## 2022-08-15 NOTE — PROGRESS NOTES
"CHIEF COMPLAINT  Back pain    Subjective   Javier Arriola is a 32 y.o. male  who presents for follow-up.  He has a history of back pain. Reports slightly increased pain over the last month gets worse over night .    Today pain is 8/10VAS in severity. Pain is located on the left side of low back. Describes this pain as a nearly continuous aching/burning with occasional sharp pains that radiate down left leg. Pain is worsened by prolonged position, bending/twisting, walking long distances, and lying down. Pain improves with rest/resposition, heat/ice, use of a TENS unit, and medication. He reports using an inversion table that is helping with increasing ROM.    Continues with Hydrocodone 5mg 2/day, Lyrica 200mg 3/day, and Flexeril 5mg 2/day. He noted some relief with Flexeril but continues to have muscle spasms daily. Denies any side effects from the regimen, including constipation and somnolence. The regimen helps decrease pain by 30-40%. Notes improvement in activity and function with regimen. ADL's by self. Denies any bowel or bladder changes.      working with [employer] to get approved for surgery that was recommended by Dr. Lozada. He has no idea when this would get approved and states he is \"miserable\" most of the time. He feels very limited by what he can do due to pain.     Procedures:  11/10/21 - L5-S1 LESI - 80% relief x week    Back Pain  This is a chronic problem. The current episode started more than 1 year ago. The problem occurs constantly. The problem has been gradually worsening since onset. The pain is present in the lumbar spine. The quality of the pain is described as burning, stabbing and shooting (shooting pain when walking). The pain radiates to the left thigh, left knee and left foot (\"all of leg\"). The pain is at a severity of 8/10. The pain is worse during the night. The symptoms are aggravated by standing, bending, twisting, lying down and position (walking). Associated symptoms " include numbness (left leg) and weakness (left leg). Pertinent negatives include no abdominal pain, chest pain, dysuria, fever or headaches. (Muscle spasms) He has tried heat, ice, muscle relaxant and analgesics (Epidural x 1 - 80% reliex x 2 weeks, TENS unit, PT x 2 rounds - caused increased pain) for the symptoms. The treatment provided mild relief.     PEG Assessment   What number best describes your pain on average in the past week?10  What number best describes how, during the past week, pain has interfered with your enjoyment of life?10  What number best describes how, during the past week, pain has interfered with your general activity?  10    Review of Pertinent Medical Data ---  Reviewed office note from CELI Cobos from 6/15/2022.  Patient presents for a follow-up appointment.  He has history of chronic back pain.  Medication regimen includes hydrocodone and Lyrica.  He has tried compound pain cream in the past but reported that this was not helpful.  He has had no relief with PT in the past. He notes some relief with the use of a TENS unit, heat, and massage.  He had a L5-S1 LESI in November 2021 which offered 80% relief.  [employer] has denied a second epidural and they also denied surgery it was recommended by Dr. Lozada.  He has working with an  to hopefully get these approved.  Follow-up this appointment was to continue medication regimen.  Given a trial of Flexeril to use as needed for muscle spasms.  Patient instructed to follow-up in 2 months or sooner if needed for pain.    The following portions of the patient's history were reviewed and updated as appropriate: allergies, current medications, past family history, past medical history, past social history, past surgical history and problem list.    Review of Systems   Constitutional: Positive for fatigue. Negative for activity change (less) and fever.   HENT: Negative for congestion.    Eyes: Negative for visual disturbance.  "  Respiratory: Negative for cough and chest tightness.    Cardiovascular: Negative for chest pain.   Gastrointestinal: Negative for abdominal pain, constipation and diarrhea.   Genitourinary: Negative for difficulty urinating and dysuria.   Musculoskeletal: Positive for back pain.   Neurological: Positive for weakness (left leg) and numbness (left leg). Negative for dizziness, light-headedness and headaches.   Psychiatric/Behavioral: Positive for agitation (pain) and sleep disturbance (pain). Negative for suicidal ideas. The patient is not nervous/anxious.      I have reviewed and confirmed the accuracy of the ROS as documented by the MA/LPANNA/RN CELI Roe    Vitals:    08/15/22 1520   BP: 130/70   BP Location: Left arm   Patient Position: Sitting   Pulse: 99   Resp: 12   Temp: 98 °F (36.7 °C)   SpO2: 97%   Weight: 115 kg (254 lb 6.4 oz)   Height: 193 cm (76\")   PainSc:   8   PainLoc: Back     Objective   Physical Exam  Constitutional:       Appearance: Normal appearance.   HENT:      Head: Normocephalic.   Cardiovascular:      Rate and Rhythm: Normal rate and regular rhythm.   Pulmonary:      Effort: Pulmonary effort is normal.      Breath sounds: Normal breath sounds.   Musculoskeletal:      Cervical back: Normal range of motion.      Lumbar back: Tenderness present. Decreased range of motion.   Skin:     General: Skin is warm and dry.      Capillary Refill: Capillary refill takes less than 2 seconds.   Neurological:      General: No focal deficit present.      Mental Status: He is alert and oriented to person, place, and time.   Psychiatric:         Mood and Affect: Mood normal.         Behavior: Behavior normal.         Thought Content: Thought content normal.         Cognition and Memory: Cognition normal.       Assessment & Plan    --- The urine drug screen confirmation from 6/15/22 has been reviewed and the result is appropriate based on patient history and HERBERT report  --- The patient signed an " updated copy of the controlled substance agreement on 2/11/22  --- Continue Lyrica 200mg TID  --- Increase Flexeril 10mg BID   --- Continue Hydrocodone. No refill needed at this time. Patient appears stable with current regimen. No adverse effects. Regarding continuation of opioids, there is no evidence of aberrant behavior or any red flags. The patient continues with appropriate response to opioid therapy. ADL's remain intact by self.   --- Follow-up 2 months or sooner if needed     HERBERT REPORT  As part of the patient's treatment plan, I am prescribing controlled substances. The patient has been made aware of appropriate use of such medications, including potential risk of somnolence, limited ability to drive and/or work safely, and the potential for dependence or overdose. It has also been made clear that these medications are for use by this patient only, without concomitant use of alcohol or other substances unless prescribed.     Patient has completed prescribing agreement detailing terms of continued prescribing of controlled substances, including monitoring HERBERT reports, urine drug screening, and pill counts if necessary. The patient is aware that inappropriate use will results in cessation of prescribing such medications.    As the clinician, I personally reviewed the HERBERT from 8/15/22 while the patient was in the office today.    History and physical exam exhibit continued safe and appropriate use of controlled substances.    Dictated utilizing Dragon dictation.     This document is intended for medical expert use only. Reading of this document by patients and/or patient's family without participating medical staff guidance may result in misinterpretation and unintended morbidity.   Any interpretation of such data is the responsibility of the patient and/or family member responsible for the patient in concert with their primary or specialist providers, not to be left for sources of online searches  such as [employer], [employer] or similar queries. Relying on these approaches to knowledge may result in misinterpretation, misguided goals of care and even death should patients or family members try recommendations outside of the realm of professional medical care in a supervised way.    Patient remained masked during entire encounter. No cough present. I donned a mask and eye protection throughout entire visit. Prior to donning mask and eye protection, hand hygiene was performed, as well as when it was doffed.  I was closer than 6 feet, but not for an extended period of time. No obvious exposure to any bodily fluids.

## 2022-08-16 ENCOUNTER — TELEPHONE (OUTPATIENT)
Dept: PAIN MEDICINE | Facility: CLINIC | Age: 32
End: 2022-08-16

## 2022-08-16 NOTE — TELEPHONE ENCOUNTER
Will you please let patient know that we discussed increasing his pain medication but feel we should utilize all available options before considering this. I sent over a prescription to his pharmacy for Flexeril 10mg. He had reported that this had helped some but he was still having muscle spasms. Hopefully increasing the dose to 10mg twice a day will help. Let me know if he has any other questions. Thanks.

## 2022-08-16 NOTE — TELEPHONE ENCOUNTER
Called and informed pt of above msg. He sts flexeril helps with willian horses but not back pain. Told him to try it for a week and if pain is still severe then call us back and I will let you know. Pt verbalized understanding.

## 2022-09-02 RX ORDER — PREGABALIN 200 MG/1
200 CAPSULE ORAL 3 TIMES DAILY
Qty: 90 CAPSULE | Refills: 2 | Status: SHIPPED | OUTPATIENT
Start: 2022-09-02 | End: 2022-11-20 | Stop reason: SDUPTHER

## 2022-09-02 RX ORDER — CYCLOBENZAPRINE HCL 10 MG
10 TABLET ORAL 2 TIMES DAILY PRN
Qty: 60 TABLET | Refills: 0 | Status: SHIPPED | OUTPATIENT
Start: 2022-09-02 | End: 2022-09-29 | Stop reason: SDUPTHER

## 2022-09-02 RX ORDER — HYDROCODONE BITARTRATE AND ACETAMINOPHEN 5; 325 MG/1; MG/1
1 TABLET ORAL EVERY 12 HOURS PRN
Qty: 60 TABLET | Refills: 0 | Status: SHIPPED | OUTPATIENT
Start: 2022-09-02 | End: 2022-10-06 | Stop reason: SDUPTHER

## 2022-09-02 NOTE — TELEPHONE ENCOUNTER
Please let him know that I have sent over the refills. I also sent over the Hydrocodone with a DNF date of 9/12/22.

## 2022-09-02 NOTE — TELEPHONE ENCOUNTER
Reviewed UDS and HERBERT. Both updated and appropriate. Refill appropriate.  DNF Hydrocodone until 9/12/22

## 2022-09-06 NOTE — TELEPHONE ENCOUNTER
Called Mr. Arriola but didn't get an answer. He doesn't have a voicemail unable to leave a message.

## 2022-09-15 ENCOUNTER — TELEPHONE (OUTPATIENT)
Dept: ORTHOPEDIC SURGERY | Facility: CLINIC | Age: 32
End: 2022-09-15

## 2022-09-15 ENCOUNTER — PREP FOR SURGERY (OUTPATIENT)
Dept: OTHER | Facility: HOSPITAL | Age: 32
End: 2022-09-15

## 2022-09-15 DIAGNOSIS — M51.26 HERNIATED LUMBAR INTERVERTEBRAL DISC: Primary | ICD-10-CM

## 2022-09-15 DIAGNOSIS — M43.10 RETROLISTHESIS OF VERTEBRAE: ICD-10-CM

## 2022-09-15 RX ORDER — CEFAZOLIN SODIUM 2 G/100ML
2 INJECTION, SOLUTION INTRAVENOUS ONCE
Status: CANCELLED | OUTPATIENT
Start: 2022-09-15 | End: 2022-09-15

## 2022-09-15 NOTE — TELEPHONE ENCOUNTER
Have spoken with nurse .  She is still working with work comp to try to get the patient surgery approved.  She is requesting a new surgery order since the other 1 has now .  New order has been faxed to both of the numbers below.

## 2022-09-15 NOTE — TELEPHONE ENCOUNTER
Caller: CLAUDIA     Relationship: CHARLES JOSUE     Best call back number: [phone]     What form or medical record are you requesting: REFAX SX ORDERS SO THAT WORK [employer] WILL APPROVE IT     How would you like to receive the form or medical records (pick-up, mail, fax): FAX   If fax, what is the fax number: [phone]

## 2022-09-30 RX ORDER — CYCLOBENZAPRINE HCL 10 MG
10 TABLET ORAL 2 TIMES DAILY PRN
Qty: 60 TABLET | Refills: 0 | Status: SHIPPED | OUTPATIENT
Start: 2022-09-30 | End: 2022-10-25

## 2022-10-06 RX ORDER — HYDROCODONE BITARTRATE AND ACETAMINOPHEN 5; 325 MG/1; MG/1
1 TABLET ORAL EVERY 12 HOURS PRN
Qty: 60 TABLET | Refills: 0 | Status: SHIPPED | OUTPATIENT
Start: 2022-10-06 | End: 2022-10-31 | Stop reason: SDUPTHER

## 2022-10-17 ENCOUNTER — TELEPHONE (OUTPATIENT)
Dept: PAIN MEDICINE | Facility: CLINIC | Age: 32
End: 2022-10-17

## 2022-10-17 ENCOUNTER — OFFICE VISIT (OUTPATIENT)
Dept: PAIN MEDICINE | Facility: CLINIC | Age: 32
End: 2022-10-17

## 2022-10-17 VITALS
SYSTOLIC BLOOD PRESSURE: 139 MMHG | BODY MASS INDEX: 31.9 KG/M2 | TEMPERATURE: 97.7 F | HEART RATE: 90 BPM | OXYGEN SATURATION: 100 % | RESPIRATION RATE: 18 BRPM | WEIGHT: 262 LBS | HEIGHT: 76 IN | DIASTOLIC BLOOD PRESSURE: 95 MMHG

## 2022-10-17 DIAGNOSIS — M54.16 LUMBAR RADICULOPATHY: Primary | ICD-10-CM

## 2022-10-17 DIAGNOSIS — M51.26 DISPLACEMENT OF LUMBAR INTERVERTEBRAL DISC WITHOUT MYELOPATHY: ICD-10-CM

## 2022-10-17 DIAGNOSIS — Z79.899 ENCOUNTER FOR LONG-TERM (CURRENT) USE OF HIGH-RISK MEDICATION: ICD-10-CM

## 2022-10-17 PROCEDURE — 99214 OFFICE O/P EST MOD 30 MIN: CPT

## 2022-10-17 NOTE — TELEPHONE ENCOUNTER
Please let patient know that I will increase his medication to three times a day with the goal of decreasing and hopefully weaning off completely once surgery is completed. He can start taking 3 a day at this time. He can send a refill request when his current prescription runs out and we will refill accordingly. He will also need to schedule an appointment with our pain psychologist due to continued use of high risk medication. He will need to schedule a 1 month follow up so we can assess whether or not the medication increase has been effective. Please let me know if he has any other questions.

## 2022-10-17 NOTE — PROGRESS NOTES
CHIEF COMPLAINT  Back pain    Subjective   Javier Arriola is a 32 y.o. male  who presents for follow-up.  He has a history of chronic back pain. He reports that his pain has been gradually worsening since his last office visit.     Today pain is 8/10VAS in severity. Pain is located on the left side of low back. Describes this pain as a nearly continuous aching/burning with occasional sharp pains that radiate down left leg. Pain is worsened by prolonged position, bending/twisting, walking long distances, and lying down. Pain improves with rest/resposition, heat/ice, use of a TENS unit, and medication. He reports using an inversion table that is helping with increasing ROM.     Continues with Hydrocodone 5mg 2/day, Lyrica 200mg 3/day, and Flexeril 10mg 2/day. He noted some relief with Flexeril but continues to have muscle spasms daily. Denies any side effects from the regimen, including constipation and somnolence. The regimen helps decrease pain by 30-40%. Notes improvement in activity and function with regimen. ADL's by self. Denies any bowel or bladder changes.     Patient is requesting an increase in pain medication at this time. He reports that pain is keeping him from being able to do things with his daughter and other activities he enjoys. He has tried PT in the past, epidural injections, OTC creams, and Lidocaine patches with little to no benefit.      His  continues to work with [employer] to get approved for surgery that was recommended by Dr. Lozada. His  is hopeful that surgery may be possible in the new year. He is scheduled to see [facility] doctor on Thursday (10/20/22).      Procedures:  11/10/21 - L5-S1 LESI - 80% relief x week    Back Pain  This is a chronic problem. The current episode started more than 1 year ago. The problem occurs constantly. The problem has been waxing and waning since onset. The pain is present in the lumbar spine. The quality of the pain is described as  "burning, stabbing and shooting (shooting pain when walking). The pain radiates to the left thigh, left knee and left foot (\"all of leg\"). The pain is at a severity of 8/10. The pain is worse during the night. The symptoms are aggravated by standing, bending, twisting, lying down and position (walking). Associated symptoms include numbness (left leg). Pertinent negatives include no abdominal pain, chest pain, dysuria, fever, headaches or weakness. (Muscle spasms) He has tried heat, ice, muscle relaxant and analgesics (Epidural x 1 - 80% reliex x 2 weeks, TENS unit, PT x 2 rounds - caused increased pain) for the symptoms. The treatment provided mild relief.     PEG Assessment   What number best describes your pain on average in the past week?8  What number best describes how, during the past week, pain has interfered with your enjoyment of life?8  What number best describes how, during the past week, pain has interfered with your general activity?  8    Review of Pertinent Medical Data ---        The following portions of the patient's history were reviewed and updated as appropriate: allergies, current medications, past family history, past medical history, past social history, past surgical history and problem list.    Review of Systems   Constitutional: Positive for fatigue. Negative for fever.   HENT: Negative for congestion.    Eyes: Negative for visual disturbance.   Respiratory: Negative for shortness of breath.    Cardiovascular: Negative for chest pain.   Gastrointestinal: Negative for abdominal pain, constipation and diarrhea.   Genitourinary: Negative for difficulty urinating and dysuria.   Musculoskeletal: Positive for back pain.   Neurological: Positive for numbness (left leg). Negative for weakness and headaches.   Psychiatric/Behavioral: Positive for sleep disturbance. Negative for suicidal ideas. The patient is not nervous/anxious.      I have reviewed and confirmed the accuracy of the ROS as " "documented by the MA/LPN/RN Veronica Byrd, CELI    Vitals:    10/17/22 1345   BP: 139/95   Pulse: 90   Resp: 18   Temp: 97.7 °F (36.5 °C)   SpO2: 100%   Weight: 119 kg (262 lb)   Height: 193 cm (76\")   PainSc:   8   PainLoc: Back     Objective   Physical Exam  Constitutional:       Appearance: Normal appearance.   HENT:      Head: Normocephalic.   Cardiovascular:      Rate and Rhythm: Normal rate and regular rhythm.   Pulmonary:      Effort: Pulmonary effort is normal.      Breath sounds: Normal breath sounds.   Musculoskeletal:      Cervical back: Normal range of motion.      Lumbar back: Tenderness present. Decreased range of motion.   Skin:     General: Skin is warm and dry.      Capillary Refill: Capillary refill takes less than 2 seconds.   Neurological:      General: No focal deficit present.      Mental Status: He is alert and oriented to person, place, and time.   Psychiatric:         Mood and Affect: Mood normal.         Behavior: Behavior normal.         Thought Content: Thought content normal.         Cognition and Memory: Cognition normal.       Assessment & Plan   Diagnoses and all orders for this visit:    1. Lumbar radiculopathy (Primary)    2. Displacement of lumbar intervertebral disc without myelopathy    3. Encounter for long-term (current) use of high-risk medication  -     Ambulatory Referral to Psychology    --- The urine drug screen confirmation from 6/15/22 has been reviewed and the result is appropriate based on patient history and HERBERT report  --- The patient signed an updated copy of the controlled substance agreement on 2/11/22   --- Continue Hydrocodone 5mg. Will increase medication regimen to TID with goal of weaning off of medication once surgery is completed. Anticipate early refill due to increase (prescription just refilled on 10/12/22). Patient appears stable with current regimen. No adverse effects. Regarding continuation of opioids, there is no evidence of aberrant behavior " or any red flags.  The patient continues with appropriate response to opioid therapy. ADL's remain intact by self.   --- Follow up with pain psychologist - Dr. Pereira due to current use of high-risk medication   --- Follow-up 1 month to assess effectiveness of medication increase     I spent greater than 30 minutes caring for Javier on this date of service. This time includes time spent by me in the following activities: preparing for the visit, reviewing tests, obtaining and/or reviewing a separately obtained history, performing a medically appropriate examination and/or evaluation, counseling and educating the patient/family/caregiver, ordering medications, tests, or procedures, referring and communicating with other health care professionals, documenting information in the medical record, independently interpreting results and communicating that information with the patient/family/caregiver and care coordination    HERBERT REPORT  As part of the patient's treatment plan, I am prescribing controlled substances. The patient has been made aware of appropriate use of such medications, including potential risk of somnolence, limited ability to drive and/or work safely, and the potential for dependence or overdose. It has also been made clear that these medications are for use by this patient only, without concomitant use of alcohol or other substances unless prescribed.     Patient has completed prescribing agreement detailing terms of continued prescribing of controlled substances, including monitoring HERBERT reports, urine drug screening, and pill counts if necessary. The patient is aware that inappropriate use will results in cessation of prescribing such medications.    As the clinician, I personally reviewed the HERBERT from 10/17/22 while the patient was in the office today.    History and physical exam exhibit continued safe and appropriate use of controlled substances.    Dictated utilizing Dragon dictation.      This document is intended for medical expert use only. Reading of this document by patients and/or patient's family without participating medical staff guidance may result in misinterpretation and unintended morbidity.   Any interpretation of such data is the responsibility of the patient and/or family member responsible for the patient in concert with their primary or specialist providers, not to be left for sources of online searches such as [employer], [employer] or similar queries. Relying on these approaches to knowledge may result in misinterpretation, misguided goals of care and even death should patients or family members try recommendations outside of the realm of professional medical care in a supervised way.    Patient remained masked during entire encounter. No cough present. I donned a mask and eye protection throughout entire visit. Prior to donning mask and eye protection, hand hygiene was performed, as well as when it was doffed.  I was closer than 6 feet, but not for an extended period of time. No obvious exposure to any bodily fluids.

## 2022-10-25 RX ORDER — CYCLOBENZAPRINE HCL 10 MG
TABLET ORAL
Qty: 60 TABLET | Refills: 0 | Status: SHIPPED | OUTPATIENT
Start: 2022-10-25 | End: 2022-11-20 | Stop reason: SDUPTHER

## 2022-10-31 RX ORDER — HYDROCODONE BITARTRATE AND ACETAMINOPHEN 5; 325 MG/1; MG/1
1 TABLET ORAL EVERY 8 HOURS PRN
Qty: 90 TABLET | Refills: 0 | Status: SHIPPED | OUTPATIENT
Start: 2022-10-31 | End: 2022-11-20 | Stop reason: SDUPTHER

## 2022-10-31 NOTE — TELEPHONE ENCOUNTER
Medication Refill Request    Date of phone call: 10/31/2022    Medication being requested: Norco 5-325 mg  sig: Take 1 tablet by mouth Every 12 (Twelve) Hours As Needed for Moderate Pain  Qty: 60    Date of last visit: 10/17/2022    Date of last refill:     HERBERT up to date?:     Next Follow up?: 11/15/2022    Any new pertinent information? (i.e, new medication allergies, new use of medications, change in patient's health or condition, non-compliance or inconsistency with prescribing agreement?):

## 2022-10-31 NOTE — TELEPHONE ENCOUNTER
Reviewed UDS and HERBERT. Both updated and appropriate. Refill appropriate.  Early refill due to increasing medication to TID at office visit on 10/17/22.

## 2022-11-01 ENCOUNTER — DOCUMENTATION (OUTPATIENT)
Dept: PHYSICAL THERAPY | Facility: CLINIC | Age: 32
End: 2022-11-01

## 2022-11-01 NOTE — PROGRESS NOTES
Discharge Summary  Discharge Summary from Physical Therapy Report      Discharge Status of Patient: See MD Note dated N/A    Goals: Not Met    Discharge Plan: Refer to other services (specify):pain management, per pt wishes      Date of Discharge 6/17/22        Ana Laura Ireland, PT  Physical Therapist  [place] License: [id number]

## 2022-11-20 DIAGNOSIS — M54.16 LUMBAR RADICULOPATHY: Primary | ICD-10-CM

## 2022-11-21 RX ORDER — HYDROCODONE BITARTRATE AND ACETAMINOPHEN 5; 325 MG/1; MG/1
1 TABLET ORAL EVERY 8 HOURS PRN
Qty: 90 TABLET | Refills: 0 | Status: SHIPPED | OUTPATIENT
Start: 2022-11-21 | End: 2022-12-19 | Stop reason: SDUPTHER

## 2022-11-21 RX ORDER — PREGABALIN 200 MG/1
200 CAPSULE ORAL 3 TIMES DAILY
Qty: 90 CAPSULE | Refills: 2 | Status: SHIPPED | OUTPATIENT
Start: 2022-11-21 | End: 2023-02-12 | Stop reason: SDUPTHER

## 2022-11-21 RX ORDER — CYCLOBENZAPRINE HCL 10 MG
10 TABLET ORAL 2 TIMES DAILY PRN
Qty: 60 TABLET | Refills: 2 | Status: SHIPPED | OUTPATIENT
Start: 2022-11-21 | End: 2023-02-04 | Stop reason: SDUPTHER

## 2022-11-21 NOTE — TELEPHONE ENCOUNTER
Reviewed UDS and HERBERT. Both updated and appropriate. Refill appropriate.  DNF Hydrocodone until 11/30/22.

## 2022-12-15 ENCOUNTER — OFFICE VISIT (OUTPATIENT)
Dept: PAIN MEDICINE | Facility: CLINIC | Age: 32
End: 2022-12-15

## 2022-12-15 VITALS
DIASTOLIC BLOOD PRESSURE: 80 MMHG | BODY MASS INDEX: 32.63 KG/M2 | HEIGHT: 76 IN | TEMPERATURE: 97.5 F | OXYGEN SATURATION: 98 % | RESPIRATION RATE: 12 BRPM | HEART RATE: 89 BPM | WEIGHT: 268 LBS | SYSTOLIC BLOOD PRESSURE: 112 MMHG

## 2022-12-15 DIAGNOSIS — Z79.899 ENCOUNTER FOR LONG-TERM (CURRENT) USE OF HIGH-RISK MEDICATION: ICD-10-CM

## 2022-12-15 DIAGNOSIS — M51.26 DISPLACEMENT OF LUMBAR INTERVERTEBRAL DISC WITHOUT MYELOPATHY: ICD-10-CM

## 2022-12-15 DIAGNOSIS — M54.16 LUMBAR RADICULOPATHY: Primary | ICD-10-CM

## 2022-12-15 PROCEDURE — 99213 OFFICE O/P EST LOW 20 MIN: CPT

## 2022-12-15 PROCEDURE — 80305 DRUG TEST PRSMV DIR OPT OBS: CPT

## 2022-12-15 NOTE — PROGRESS NOTES
CHIEF COMPLAINT  Back pain    Subjective   Javier Arriola is a 32 y.o. male  who presents for follow-up.  He has a history of chronic back pain. He reports that his pain has been consistent since his last office visit.      Today pain is 8/10VAS in severity. Pain is located on the left side of low back. Describes this pain as a nearly continuous aching/burning with occasional sharp pains that radiate down left leg. Pain is worsened by prolonged position, bending/twisting, walking long distances, and lying down. Pain improves with rest/resposition, heat/ice, use of a TENS unit, and medication. He reports using an inversion table that is helping with increasing ROM. *He has tried PT in the past, epidural injections, OTC creams, and Lidocaine patches with little to no benefit.      Continues with Hydrocodone 5mg 3/day, Lyrica 200mg 3/day, and Flexeril 10mg 2/day. He noted some relief with Flexeril but continues to have muscle spasms daily. At last office visit, Hydrocodone was increased to 3/day. He reports that this increase has been helpful and allows him to be more active and do more things with his daughter.Denies any side effects from the regimen, including constipation and somnolence. The regimen helps decrease pain by 40-50%. Notes improvement in activity and function with regimen. ADL's by self. Denies any bowel or bladder changes.     His  continues to work with [employer] to get approved for surgery that was recommended by Dr. Lozada. His  is hopeful that surgery may be possible in the new year. He is scheduled to see [facility] doctor on Thursday (10/20/22).       Procedures:  11/10/21 - L5-S1 LESI - 80% relief x week    Back Pain  This is a chronic problem. The current episode started more than 1 year ago. The problem occurs constantly. The problem is unchanged (unchanged since last office visit). The pain is present in the lumbar spine. The quality of the pain is described as burning,  "stabbing and shooting (shooting pain when walking). The pain radiates to the left thigh, left knee and left foot (\"all of leg\"). The pain is at a severity of 8/10. The pain is worse during the night. The symptoms are aggravated by standing, bending, twisting, lying down and position (walking). Associated symptoms include numbness (LEFT LEG) and weakness (LEFT LEG). Pertinent negatives include no abdominal pain, chest pain, dysuria, fever or headaches. (Muscle spasms) He has tried heat, ice, muscle relaxant and analgesics (Epidural x 1 - 80% reliex x 2 weeks, TENS unit, PT x 2 rounds - caused increased pain) for the symptoms. The treatment provided mild relief.      PEG Assessment   What number best describes your pain on average in the past week?8  What number best describes how, during the past week, pain has interfered with your enjoyment of life?10  What number best describes how, during the past week, pain has interfered with your general activity?  8    The following portions of the patient's history were reviewed and updated as appropriate: allergies, current medications, past family history, past medical history, past social history, past surgical history and problem list.    Review of Systems   Constitutional: Negative for activity change (LESS), fatigue and fever.   HENT: Negative for congestion.    Eyes: Negative for visual disturbance.   Respiratory: Negative for cough and chest tightness.    Cardiovascular: Negative for chest pain.   Gastrointestinal: Negative for abdominal pain, constipation and diarrhea.   Genitourinary: Negative for difficulty urinating and dysuria.   Musculoskeletal: Positive for back pain.   Neurological: Positive for weakness (LEFT LEG) and numbness (LEFT LEG). Negative for dizziness, light-headedness and headaches.   Psychiatric/Behavioral: Positive for sleep disturbance. Negative for agitation and suicidal ideas. The patient is not nervous/anxious.      I have reviewed and " "confirmed the accuracy of the ROS as documented by the MA/LPN/RN CELI Roe    Vitals:    12/15/22 1411   BP: 112/80   BP Location: Left arm   Patient Position: Sitting   Cuff Size: Large Adult   Pulse: 89   Resp: 12   Temp: 97.5 °F (36.4 °C)   TempSrc: Temporal   SpO2: 98%   Weight: 122 kg (268 lb)   Height: 193 cm (76\")   PainSc:   8   PainLoc: Comment: back     Objective   Physical Exam  Constitutional:       Appearance: Normal appearance.   HENT:      Head: Normocephalic.   Cardiovascular:      Rate and Rhythm: Normal rate and regular rhythm.   Pulmonary:      Effort: Pulmonary effort is normal.      Breath sounds: Normal breath sounds.   Musculoskeletal:      Cervical back: Normal range of motion.      Lumbar back: Tenderness present. Decreased range of motion.   Skin:     General: Skin is warm and dry.      Capillary Refill: Capillary refill takes less than 2 seconds.   Neurological:      General: No focal deficit present.      Mental Status: He is alert and oriented to person, place, and time.   Psychiatric:         Mood and Affect: Mood normal.         Behavior: Behavior normal.         Thought Content: Thought content normal.         Cognition and Memory: Cognition normal.       Assessment & Plan   Diagnoses and all orders for this visit:    1. Lumbar radiculopathy (Primary)    2. Displacement of lumbar intervertebral disc without myelopathy    3. Encounter for long-term (current) use of high-risk medication    --- Routine UDS in office today as part of monitoring requirements for controlled substances.  The specimen was viewed and the immunoassay result reviewed and is  NEG (last medication was a few days ago - patient report he has had an upset stomach and has not been able to tolerate medication). This specimen will be sent to [facility] for confirmation.     --- The patient signed an updated copy of the controlled substance agreement on 2/11/22  --- Continue Hydrocodone. No refill " needed at this time. Patient appears stable with current regimen. No adverse effects. Regarding continuation of opioids, there is no evidence of aberrant behavior or any red flags.  The patient continues with appropriate response to opioid therapy. ADL's remain intact by self.   --- Follow-up 2 months     HERBERT REPORT  As part of the patient's treatment plan, I am prescribing controlled substances. The patient has been made aware of appropriate use of such medications, including potential risk of somnolence, limited ability to drive and/or work safely, and the potential for dependence or overdose. It has also been made clear that these medications are for use by this patient only, without concomitant use of alcohol or other substances unless prescribed.     Patient has completed prescribing agreement detailing terms of continued prescribing of controlled substances, including monitoring HERBERT reports, urine drug screening, and pill counts if necessary. The patient is aware that inappropriate use will results in cessation of prescribing such medications.    As the clinician, I personally reviewed the HERBERT from 12/15/22 while the patient was in the office today.    History and physical exam exhibit continued safe and appropriate use of controlled substances.    Dictated utilizing Dragon dictation.     Patient remained masked during entire encounter. No cough present. I donned a mask and eye protection throughout entire visit. Prior to donning mask and eye protection, hand hygiene was performed, as well as when it was doffed.  I was closer than 6 feet, but not for an extended period of time. No obvious exposure to any bodily fluids.

## 2022-12-19 DIAGNOSIS — M54.16 LUMBAR RADICULOPATHY: ICD-10-CM

## 2022-12-21 ENCOUNTER — CLINICAL SUPPORT (OUTPATIENT)
Dept: PAIN MEDICINE | Facility: CLINIC | Age: 32
End: 2022-12-21

## 2022-12-21 DIAGNOSIS — Z79.899 ENCOUNTER FOR LONG-TERM (CURRENT) USE OF HIGH-RISK MEDICATION: Primary | ICD-10-CM

## 2022-12-21 PROCEDURE — 80305 DRUG TEST PRSMV DIR OPT OBS: CPT | Performed by: NURSE PRACTITIONER

## 2022-12-21 NOTE — PROGRESS NOTES
Random uds and pill count:    Pt presented at 1135am    Norco 5/325mg  1 tab po q 8 hours prn   Qty 90 on bottle  [facility] [phone]  Date filled on bottle: 11/29/22  # pills counted: 27    Lyrica 200mg  1 caps po tid  [employer] [phone]  Date filled on bottle: 12/17/22  Qty 90  #pills counted: 86    Results uds: +opi

## 2022-12-21 NOTE — TELEPHONE ENCOUNTER
Will wait to refill until his confirmation is back. He is not due until 12/29/2022.     TRINIDAD Vee

## 2022-12-27 RX ORDER — HYDROCODONE BITARTRATE AND ACETAMINOPHEN 5; 325 MG/1; MG/1
1 TABLET ORAL EVERY 8 HOURS PRN
Qty: 90 TABLET | Refills: 0 | Status: SHIPPED | OUTPATIENT
Start: 2022-12-27 | End: 2023-01-23 | Stop reason: SDUPTHER

## 2023-01-23 DIAGNOSIS — M54.16 LUMBAR RADICULOPATHY: ICD-10-CM

## 2023-01-23 RX ORDER — HYDROCODONE BITARTRATE AND ACETAMINOPHEN 5; 325 MG/1; MG/1
1 TABLET ORAL EVERY 8 HOURS PRN
Qty: 90 TABLET | Refills: 0 | Status: SHIPPED | OUTPATIENT
Start: 2023-01-23 | End: 2023-02-27 | Stop reason: SDUPTHER

## 2023-02-04 DIAGNOSIS — M62.838 MUSCLE SPASM: Primary | ICD-10-CM

## 2023-02-06 RX ORDER — CYCLOBENZAPRINE HCL 10 MG
10 TABLET ORAL 2 TIMES DAILY PRN
Qty: 60 TABLET | Refills: 2 | Status: SHIPPED | OUTPATIENT
Start: 2023-02-06

## 2023-02-12 DIAGNOSIS — M54.16 LUMBAR RADICULOPATHY: ICD-10-CM

## 2023-02-13 DIAGNOSIS — M54.16 LUMBAR RADICULOPATHY: ICD-10-CM

## 2023-02-13 RX ORDER — PREGABALIN 200 MG/1
200 CAPSULE ORAL 3 TIMES DAILY
Qty: 90 CAPSULE | Refills: 2 | OUTPATIENT
Start: 2023-02-13

## 2023-02-13 RX ORDER — PREGABALIN 200 MG/1
200 CAPSULE ORAL 3 TIMES DAILY
Qty: 90 CAPSULE | Refills: 0 | Status: SHIPPED | OUTPATIENT
Start: 2023-02-13 | End: 2023-03-12 | Stop reason: SDUPTHER

## 2023-02-27 DIAGNOSIS — M54.16 LUMBAR RADICULOPATHY: ICD-10-CM

## 2023-02-27 RX ORDER — HYDROCODONE BITARTRATE AND ACETAMINOPHEN 5; 325 MG/1; MG/1
1 TABLET ORAL EVERY 8 HOURS PRN
Qty: 15 TABLET | Refills: 0 | Status: SHIPPED | OUTPATIENT
Start: 2023-02-27 | End: 2023-03-03 | Stop reason: SDUPTHER

## 2023-02-27 NOTE — TELEPHONE ENCOUNTER
Reviewed UDS and HERBERT. Both updated and appropriate. Refill appropriate.  Will send partial supply until patient has OV on 3/3/23.

## 2023-03-03 ENCOUNTER — OFFICE VISIT (OUTPATIENT)
Dept: PAIN MEDICINE | Facility: CLINIC | Age: 33
End: 2023-03-03
Payer: OTHER MISCELLANEOUS

## 2023-03-03 VITALS
HEIGHT: 76 IN | TEMPERATURE: 96.4 F | OXYGEN SATURATION: 99 % | DIASTOLIC BLOOD PRESSURE: 80 MMHG | HEART RATE: 83 BPM | BODY MASS INDEX: 33.76 KG/M2 | RESPIRATION RATE: 12 BRPM | WEIGHT: 277.2 LBS | SYSTOLIC BLOOD PRESSURE: 150 MMHG

## 2023-03-03 DIAGNOSIS — Z79.899 ENCOUNTER FOR LONG-TERM (CURRENT) USE OF HIGH-RISK MEDICATION: ICD-10-CM

## 2023-03-03 DIAGNOSIS — M54.16 LUMBAR RADICULOPATHY: Primary | ICD-10-CM

## 2023-03-03 DIAGNOSIS — M62.838 MUSCLE SPASM: ICD-10-CM

## 2023-03-03 PROCEDURE — 99213 OFFICE O/P EST LOW 20 MIN: CPT

## 2023-03-03 RX ORDER — HYDROCODONE BITARTRATE AND ACETAMINOPHEN 5; 325 MG/1; MG/1
1 TABLET ORAL EVERY 8 HOURS PRN
Qty: 90 TABLET | Refills: 0 | Status: SHIPPED | OUTPATIENT
Start: 2023-03-03 | End: 2023-03-28 | Stop reason: SDUPTHER

## 2023-03-03 RX ORDER — DULOXETIN HYDROCHLORIDE 60 MG/1
60 CAPSULE, DELAYED RELEASE ORAL 2 TIMES DAILY
COMMUNITY
Start: 2023-02-25

## 2023-03-03 NOTE — PROGRESS NOTES
"CHIEF COMPLAINT  Back pain    Subjective   Javier Arriola is a 32 y.o. male  who presents for follow-up.  He has a history of history of chronic back pain. He reports that his pain has been consistent since his last office visit.      Today pain is 8/10VAS in severity. Pain is located on the left side of low back. Describes this pain as a nearly continuous aching/burning with occasional sharp pains that radiate down left leg. Pain is worsened by prolonged position, bending/twisting, walking long distances, and lying down. Pain improves with rest/resposition, heat/ice, use of a TENS unit, and medication. He reports using an inversion table that is helping with increasing ROM. He has tried PT in the past, epidural injections, OTC creams, and Lidocaine patches with little to no benefit.      Continues with Hydrocodone 5mg 3/day, Lyrica 200mg 3/day, and Flexeril 10mg 2/day. He was recently started on Cymbalta 60mg daily by his PCP. Denies any side effects from the regimen, including constipation and somnolence. The regimen helps decrease pain by 40-50%. Notes improvement in activity and function with regimen. ADL's by self. Denies any bowel or bladder changes.     His  continues to work with [employer] to get approved for surgery that was recommended by Dr. Lozada. He saw his  on 2/1/23 so hopefully surgery will happen sometime this year.      Procedures:  11/10/21 - L5-S1 LESI - 80% relief x week    Back Pain  This is a chronic problem. The current episode started more than 1 year ago. The problem occurs constantly. The problem has been waxing and waning since onset. The pain is present in the lumbar spine. The quality of the pain is described as burning, stabbing and shooting (shooting pain when walking). The pain radiates to the left thigh, left knee and left foot (\"all of leg\"). The pain is at a severity of 8/10. The pain is worse during the night. The symptoms are aggravated by standing, bending, " twisting, lying down and position (walking. weather changes, stress). Associated symptoms include headaches. Pertinent negatives include no abdominal pain, chest pain, dysuria, fever, numbness or weakness. (Muscle spasms) He has tried heat, ice, muscle relaxant and analgesics (Epidural x 1 - 80% reliex x 2 weeks, TENS unit, PT x 2 rounds - caused increased pain) for the symptoms. The treatment provided mild relief.     PEG Assessment   What number best describes your pain on average in the past week?7  What number best describes how, during the past week, pain has interfered with your enjoyment of life?7  What number best describes how, during the past week, pain has interfered with your general activity?  7    The following portions of the patient's history were reviewed and updated as appropriate: allergies, current medications, past family history, past medical history, past social history, past surgical history and problem list.    Review of Systems   Constitutional: Negative for activity change, fatigue and fever.   HENT: Negative for congestion.    Eyes: Negative for visual disturbance.   Respiratory: Negative for cough and chest tightness.    Cardiovascular: Negative for chest pain.   Gastrointestinal: Negative for abdominal pain, constipation and diarrhea.   Genitourinary: Negative for difficulty urinating and dysuria.   Musculoskeletal: Positive for back pain.   Neurological: Positive for headaches. Negative for dizziness, weakness, light-headedness and numbness.   Psychiatric/Behavioral: Positive for sleep disturbance. Negative for agitation and suicidal ideas. The patient is not nervous/anxious.      I have reviewed and confirmed the accuracy of the ROS as documented by the MA/LPN/RN CELI Roe    Vitals:    03/03/23 1414 03/03/23 1422   BP: (!) 148/112 150/80   BP Location: Left arm Left arm   Patient Position: Sitting Sitting   Cuff Size: Adult    Pulse: 83    Resp: 12    Temp: 96.4 °F (35.8  "°C)    TempSrc: Temporal    SpO2: 99%    Weight: 126 kg (277 lb 3.2 oz)    Height: 193 cm (76\")    PainSc:   8      Objective   Physical Exam  Constitutional:       Appearance: Normal appearance.   HENT:      Head: Normocephalic.   Cardiovascular:      Rate and Rhythm: Normal rate and regular rhythm.   Pulmonary:      Effort: Pulmonary effort is normal.      Breath sounds: Normal breath sounds.   Musculoskeletal:      Cervical back: Normal range of motion.      Lumbar back: Tenderness present. Decreased range of motion.   Skin:     General: Skin is warm and dry.      Capillary Refill: Capillary refill takes less than 2 seconds.   Neurological:      General: No focal deficit present.      Mental Status: He is alert and oriented to person, place, and time.   Psychiatric:         Mood and Affect: Mood normal.         Behavior: Behavior normal.         Thought Content: Thought content normal.         Cognition and Memory: Cognition normal.       Assessment & Plan   Diagnoses and all orders for this visit:    1. Lumbar radiculopathy (Primary)  -     HYDROcodone-acetaminophen (NORCO) 5-325 MG per tablet; Take 1 tablet by mouth Every 8 (Eight) Hours As Needed for Moderate Pain. 30 day supply  Dispense: 90 tablet; Refill: 0    2. Muscle spasm    3. Encounter for long-term (current) use of high-risk medication    --- The urine drug screen confirmation from 12/15/23 has been reviewed and the result is appropriate based on patient history and HERBERT report  --- The patient signed an updated copy of the controlled substance agreement on 3/3/23  --- Continue medication regimen. No other refills needed at this time.   --- Refill Hydrocodone. Patient appears stable with current regimen. No adverse effects. Regarding continuation of opioids, there is no evidence of aberrant behavior or any red flags.  The patient continues with appropriate response to opioid therapy. ADL's remain intact by self.   --- Follow-up 2 months or sooner " if needed     HERBERT REPORT  As part of the patient's treatment plan, I am prescribing controlled substances. The patient has been made aware of appropriate use of such medications, including potential risk of somnolence, limited ability to drive and/or work safely, and the potential for dependence or overdose. It has also been made clear that these medications are for use by this patient only, without concomitant use of alcohol or other substances unless prescribed.     Patient has completed prescribing agreement detailing terms of continued prescribing of controlled substances, including monitoring HERBERT reports, urine drug screening, and pill counts if necessary. The patient is aware that inappropriate use will results in cessation of prescribing such medications.    As the clinician, I personally reviewed the HERBERT from 3/3/23 while the patient was in the office today.    History and physical exam exhibit continued safe and appropriate use of controlled substances.    Dictated utilizing Dragon dictation.     Patient remained masked during entire encounter. No cough present. I donned a mask and eye protection throughout entire visit. Prior to donning mask and eye protection, hand hygiene was performed, as well as when it was doffed.  I was closer than 6 feet, but not for an extended period of time. No obvious exposure to any bodily fluids.

## 2023-03-12 DIAGNOSIS — M62.838 MUSCLE SPASM: ICD-10-CM

## 2023-03-12 DIAGNOSIS — M54.16 LUMBAR RADICULOPATHY: ICD-10-CM

## 2023-03-12 RX ORDER — CYCLOBENZAPRINE HCL 10 MG
10 TABLET ORAL 2 TIMES DAILY PRN
Qty: 60 TABLET | Refills: 2 | Status: CANCELLED | OUTPATIENT
Start: 2023-03-12

## 2023-03-13 RX ORDER — PREGABALIN 200 MG/1
200 CAPSULE ORAL 3 TIMES DAILY
Qty: 90 CAPSULE | Refills: 2 | Status: SHIPPED | OUTPATIENT
Start: 2023-03-13

## 2023-03-13 NOTE — TELEPHONE ENCOUNTER
Refuse cyclobenzaprine she has 2 refills available.  
Reviewed UDS and HERBERT. Both updated and appropriate. Refill appropriate.      
BLAKE, HTN

## 2023-03-28 DIAGNOSIS — M54.16 LUMBAR RADICULOPATHY: ICD-10-CM

## 2023-03-30 RX ORDER — HYDROCODONE BITARTRATE AND ACETAMINOPHEN 5; 325 MG/1; MG/1
1 TABLET ORAL EVERY 8 HOURS PRN
Qty: 90 TABLET | Refills: 0 | Status: SHIPPED | OUTPATIENT
Start: 2023-03-30

## 2023-04-11 DIAGNOSIS — M54.16 LUMBAR RADICULOPATHY: ICD-10-CM

## 2023-04-11 RX ORDER — PREGABALIN 200 MG/1
200 CAPSULE ORAL 3 TIMES DAILY
Qty: 90 CAPSULE | Refills: 2 | Status: SHIPPED | OUTPATIENT
Start: 2023-04-11

## 2023-04-16 DIAGNOSIS — M62.838 MUSCLE SPASM: ICD-10-CM

## 2023-04-17 RX ORDER — CYCLOBENZAPRINE HCL 10 MG
10 TABLET ORAL 2 TIMES DAILY PRN
Qty: 60 TABLET | Refills: 2 | OUTPATIENT
Start: 2023-04-17

## 2023-04-21 DIAGNOSIS — M62.838 MUSCLE SPASM: ICD-10-CM

## 2023-04-21 RX ORDER — CYCLOBENZAPRINE HCL 10 MG
TABLET ORAL
Qty: 60 TABLET | Refills: 2 | Status: SHIPPED | OUTPATIENT
Start: 2023-04-21

## 2023-05-02 ENCOUNTER — OFFICE VISIT (OUTPATIENT)
Dept: PAIN MEDICINE | Facility: CLINIC | Age: 33
End: 2023-05-02
Payer: OTHER MISCELLANEOUS

## 2023-05-02 VITALS
SYSTOLIC BLOOD PRESSURE: 110 MMHG | DIASTOLIC BLOOD PRESSURE: 73 MMHG | WEIGHT: 276.2 LBS | RESPIRATION RATE: 18 BRPM | OXYGEN SATURATION: 100 % | HEIGHT: 76 IN | HEART RATE: 81 BPM | TEMPERATURE: 97.7 F | BODY MASS INDEX: 33.63 KG/M2

## 2023-05-02 DIAGNOSIS — M54.16 LUMBAR RADICULOPATHY: ICD-10-CM

## 2023-05-02 DIAGNOSIS — M62.838 MUSCLE SPASM: Primary | ICD-10-CM

## 2023-05-02 DIAGNOSIS — Z79.899 ENCOUNTER FOR LONG-TERM (CURRENT) USE OF HIGH-RISK MEDICATION: ICD-10-CM

## 2023-05-02 DIAGNOSIS — M51.26 DISPLACEMENT OF LUMBAR INTERVERTEBRAL DISC WITHOUT MYELOPATHY: ICD-10-CM

## 2023-05-02 RX ORDER — HYDROCODONE BITARTRATE AND ACETAMINOPHEN 5; 325 MG/1; MG/1
1 TABLET ORAL EVERY 8 HOURS PRN
Qty: 90 TABLET | Refills: 0 | Status: SHIPPED | OUTPATIENT
Start: 2023-05-02

## 2023-05-02 RX ORDER — MELOXICAM 15 MG/1
15 TABLET ORAL DAILY
Qty: 30 TABLET | Refills: 0 | Status: SHIPPED | OUTPATIENT
Start: 2023-05-02

## 2023-05-02 RX ORDER — BUSPIRONE HYDROCHLORIDE 30 MG/1
1 TABLET ORAL 3 TIMES DAILY
COMMUNITY
Start: 2023-04-27

## 2023-05-02 RX ORDER — BUPROPION HYDROCHLORIDE 150 MG/1
1 TABLET ORAL DAILY
COMMUNITY
Start: 2023-04-17

## 2023-05-02 NOTE — PROGRESS NOTES
"CHIEF COMPLAINT  Back pain    Subjective   Javier Arriola is a 32 y.o. male  who presents for follow-up.  He has a history of chronic back pain. He reports that his pain has been consistent since his last office visit.      Today pain is 8/10VAS in severity. Pain is located on the left side of low back. Describes this pain as a nearly continuous aching/burning with occasional sharp pains that radiate down left leg. Pain is worsened by prolonged position, bending/twisting, walking long distances, and lying down. Pain improves with rest/resposition, heat/ice, use of a TENS unit, and medication. He reports using an inversion table that is helping with increasing ROM. He has tried PT in the past, epidural injections, OTC creams, and Lidocaine patches with little to no benefit.      Continues with Hydrocodone 5mg 3/day, Lyrica 200mg 3/day, Flexeril 10mg 2/day, and Cymbalta 60mg daily. Denies any side effects from the regimen, including constipation and somnolence. The regimen helps decrease pain by 40-50%. Notes improvement in activity and function with regimen. ADL's by self. Denies any bowel or bladder changes.      His  continues to work with [employer] to get approved for surgery that was recommended by Dr. Lozada. He saw his  on 2/1/23 so hopefully surgery will happen sometime this year. [employer] is continuing to work on this.      Procedures:  11/10/21 - L5-S1 LESI - 80% relief x 1 week     Back Pain  This is a chronic problem. The current episode started more than 1 year ago. The problem occurs constantly. The problem is unchanged (unchanged since last office visit). The pain is present in the lumbar spine. The quality of the pain is described as burning, stabbing and shooting (shooting pain when walking). The pain radiates to the left thigh, left knee and left foot (\"all of leg\"). The pain is at a severity of 8/10. The pain is worse during the night. The symptoms are aggravated by standing, " "bending, twisting, lying down and position (walking. weather changes, stress). Associated symptoms include headaches. Pertinent negatives include no abdominal pain, chest pain, dysuria, fever, numbness or weakness. (Muscle spasms) He has tried heat, ice, muscle relaxant and analgesics (Epidural x 1 - 80% reliex x 2 weeks, TENS unit, PT x 2 rounds - caused increased pain) for the symptoms. The treatment provided mild relief.      PEG Assessment   What number best describes your pain on average in the past week?7  What number best describes how, during the past week, pain has interfered with your enjoyment of life?8  What number best describes how, during the past week, pain has interfered with your general activity?  7    Review of Pertinent Medical Data ---        The following portions of the patient's history were reviewed and updated as appropriate: allergies, current medications, past family history, past medical history, past social history, past surgical history and problem list.    Review of Systems   Constitutional: Negative for fever.   Cardiovascular: Negative for chest pain.   Gastrointestinal: Negative for abdominal pain, constipation and diarrhea.   Genitourinary: Negative for difficulty urinating and dysuria.   Musculoskeletal: Positive for back pain.   Neurological: Positive for headaches. Negative for weakness and numbness.   Psychiatric/Behavioral: Negative for sleep disturbance and suicidal ideas. The patient is not nervous/anxious.      I have reviewed and confirmed the accuracy of the ROS as documented by the MA/LPN/RN CELI Roe    Vitals:    05/02/23 1431   BP: 110/73   Pulse: 81   Resp: 18   Temp: 97.7 °F (36.5 °C)   SpO2: 100%   Weight: 125 kg (276 lb 3.2 oz)   Height: 193 cm (76\")   PainSc:   8   PainLoc: Back     Objective   Physical Exam  Constitutional:       Appearance: Normal appearance.   HENT:      Head: Normocephalic.   Cardiovascular:      Rate and Rhythm: Normal rate " and regular rhythm.   Pulmonary:      Effort: Pulmonary effort is normal.      Breath sounds: Normal breath sounds.   Musculoskeletal:      Cervical back: Normal range of motion.      Lumbar back: Tenderness present. Decreased range of motion. Positive left straight leg raise test.   Skin:     General: Skin is warm and dry.      Capillary Refill: Capillary refill takes less than 2 seconds.   Neurological:      General: No focal deficit present.      Mental Status: He is alert and oriented to person, place, and time.   Psychiatric:         Mood and Affect: Mood normal.         Behavior: Behavior normal.         Thought Content: Thought content normal.         Cognition and Memory: Cognition normal.       Assessment & Plan   Diagnoses and all orders for this visit:    1. Muscle spasm (Primary)  -     Urine Drug Screen Confirmation - Urine, Clean Catch; Future  -     POC Urine Drug Screen, Triage    2. Lumbar radiculopathy  -     HYDROcodone-acetaminophen (NORCO) 5-325 MG per tablet; Take 1 tablet by mouth Every 8 (Eight) Hours As Needed for Moderate Pain. 30 day supply  Dispense: 90 tablet; Refill: 0  -     Urine Drug Screen Confirmation - Urine, Clean Catch; Future  -     POC Urine Drug Screen, Triage    3. Displacement of lumbar intervertebral disc without myelopathy  -     meloxicam (MOBIC) 15 MG tablet; Take 1 tablet by mouth Daily.  Dispense: 30 tablet; Refill: 0  -     Urine Drug Screen Confirmation - Urine, Clean Catch; Future  -     POC Urine Drug Screen, Triage    4. Encounter for long-term (current) use of high-risk medication  -     Urine Drug Screen Confirmation - Urine, Clean Catch; Future  -     POC Urine Drug Screen, Triage    --- Routine UDS in office today as part of monitoring requirements for controlled substances.  The specimen was viewed and the immunoassay result reviewed and is +OPI.  This specimen will be sent to [facility] for confirmation.     --- The patient signed an updated copy  of the controlled substance agreement on 3/3/23.  --- Trial Meloxicam 15mg. Discussed medication with the patient.  Included in this discussion was the potential for side effects and adverse events.  Patient verbalized understanding and wished to proceed.  Prescription will be sent to pharmacy.  --- Refill Hydrocodone. DNF 5/8/23. Patient appears stable with current regimen. No adverse effects. Regarding continuation of opioids, there is no evidence of aberrant behavior or any red flags.  The patient continues with appropriate response to opioid therapy. ADL's remain intact by self.   --- Follow-up 2 months or sooner if needed     HERBERT REPORT  As part of the patient's treatment plan, I am prescribing controlled substances. The patient has been made aware of appropriate use of such medications, including potential risk of somnolence, limited ability to drive and/or work safely, and the potential for dependence or overdose. It has also been made clear that these medications are for use by this patient only, without concomitant use of alcohol or other substances unless prescribed.     Patient has completed prescribing agreement detailing terms of continued prescribing of controlled substances, including monitoring HERBERT reports, urine drug screening, and pill counts if necessary. The patient is aware that inappropriate use will results in cessation of prescribing such medications.    As the clinician, I personally reviewed the HERBERT from 5/2/23 while the patient was in the office today.    History and physical exam exhibit continued safe and appropriate use of controlled substances.    Dictated utilizing Dragon dictation.

## 2023-05-15 DIAGNOSIS — M51.26 DISPLACEMENT OF LUMBAR INTERVERTEBRAL DISC WITHOUT MYELOPATHY: ICD-10-CM

## 2023-05-15 RX ORDER — MELOXICAM 15 MG/1
15 TABLET ORAL DAILY
Qty: 30 TABLET | Refills: 0 | OUTPATIENT
Start: 2023-05-15

## 2023-06-03 DIAGNOSIS — M62.838 MUSCLE SPASM: ICD-10-CM

## 2023-06-03 DIAGNOSIS — M51.26 DISPLACEMENT OF LUMBAR INTERVERTEBRAL DISC WITHOUT MYELOPATHY: ICD-10-CM

## 2023-06-03 DIAGNOSIS — M54.16 LUMBAR RADICULOPATHY: ICD-10-CM

## 2023-06-03 RX ORDER — MELOXICAM 15 MG/1
15 TABLET ORAL DAILY
Qty: 30 TABLET | Refills: 0 | Status: CANCELLED | OUTPATIENT
Start: 2023-06-03

## 2023-06-05 RX ORDER — HYDROCODONE BITARTRATE AND ACETAMINOPHEN 5; 325 MG/1; MG/1
1 TABLET ORAL EVERY 8 HOURS PRN
Qty: 18 TABLET | Refills: 0 | Status: SHIPPED | OUTPATIENT
Start: 2023-06-05 | End: 2023-06-12 | Stop reason: SDUPTHER

## 2023-06-05 RX ORDER — CYCLOBENZAPRINE HCL 10 MG
10 TABLET ORAL 2 TIMES DAILY PRN
Qty: 60 TABLET | Refills: 0 | Status: SHIPPED | OUTPATIENT
Start: 2023-06-05

## 2023-06-05 NOTE — TELEPHONE ENCOUNTER
CATHERINE has both Lyrica and Gabapentin in it. It appears he has not been prescribed Gabapentin by our clinic since 2021. Please find out where he got the Gabapentin from.

## 2023-06-05 NOTE — TELEPHONE ENCOUNTER
I will give a temporary supply of medication to last until Veronica gets back, but taking his wife's Gabapentin is a breach of his controlled substance agreement. Due to this it is possible we may stop prescribing controlled substances. I will also not refill his meloxicam at this time as his kidney function on his last lab work was elevated. Will defer to Veronica when she returns from vacation.

## 2023-06-12 RX ORDER — HYDROCODONE BITARTRATE AND ACETAMINOPHEN 5; 325 MG/1; MG/1
1 TABLET ORAL EVERY 8 HOURS PRN
Qty: 21 TABLET | Refills: 0 | Status: SHIPPED | OUTPATIENT
Start: 2023-06-12

## 2023-06-12 NOTE — TELEPHONE ENCOUNTER
Please discharge patient due to taking unprescribed medication. Patient was taking wife's Gabapentin and also taking Pregabalin that is prescribed by our office. He also had an abnormal UDS from 12/15/22. Weaning dose of Hydrocodone sent to pharmacy. He is to take Hydrocodone 5mg BID x 7 days, then once a day x 7 days, then stop.

## 2024-02-29 ENCOUNTER — HOSPITAL ENCOUNTER (EMERGENCY)
Facility: HOSPITAL | Age: 34
Discharge: HOME | End: 2024-02-29
Attending: EMERGENCY MEDICINE | Admitting: EMERGENCY MEDICINE
Payer: COMMERCIAL

## 2024-02-29 ENCOUNTER — APPOINTMENT (OUTPATIENT)
Dept: CT IMAGING | Facility: HOSPITAL | Age: 34
End: 2024-02-29
Payer: COMMERCIAL

## 2024-02-29 VITALS
OXYGEN SATURATION: 100 % | WEIGHT: 275 LBS | HEIGHT: 76 IN | BODY MASS INDEX: 33.49 KG/M2 | TEMPERATURE: 97.4 F | SYSTOLIC BLOOD PRESSURE: 158 MMHG | RESPIRATION RATE: 24 BRPM | HEART RATE: 105 BPM | DIASTOLIC BLOOD PRESSURE: 105 MMHG

## 2024-02-29 DIAGNOSIS — R10.9 FLANK PAIN: Primary | ICD-10-CM

## 2024-02-29 DIAGNOSIS — N23 RENAL COLIC ON RIGHT SIDE: ICD-10-CM

## 2024-02-29 LAB
ALBUMIN SERPL-MCNC: 4.8 G/DL (ref 3.5–5.2)
ALBUMIN/GLOB SERPL: 1.7 G/DL
ALP SERPL-CCNC: 109 U/L (ref 39–117)
ALT SERPL W P-5'-P-CCNC: 11 U/L (ref 1–41)
ANION GAP SERPL CALCULATED.3IONS-SCNC: 14.5 MMOL/L (ref 5–15)
AST SERPL-CCNC: 24 U/L (ref 1–40)
BACTERIA UR QL AUTO: NORMAL /HPF
BASOPHILS # BLD AUTO: 0.01 10*3/MM3 (ref 0–0.2)
BASOPHILS NFR BLD AUTO: 0.1 % (ref 0–1.5)
BILIRUB SERPL-MCNC: 0.3 MG/DL (ref 0–1.2)
BILIRUB UR QL STRIP: NEGATIVE
BUN SERPL-MCNC: 11 MG/DL (ref 6–20)
BUN/CREAT SERPL: 8 (ref 7–25)
CALCIUM SPEC-SCNC: 9.6 MG/DL (ref 8.6–10.5)
CHLORIDE SERPL-SCNC: 105 MMOL/L (ref 98–107)
CLARITY UR: CLEAR
CO2 SERPL-SCNC: 21.5 MMOL/L (ref 22–29)
COLOR UR: YELLOW
CREAT SERPL-MCNC: 1.37 MG/DL (ref 0.76–1.27)
DEPRECATED RDW RBC AUTO: 42.6 FL (ref 37–54)
EGFRCR SERPLBLD CKD-EPI 2021: 69 ML/MIN/1.73
EOSINOPHIL # BLD AUTO: 0.11 10*3/MM3 (ref 0–0.4)
EOSINOPHIL NFR BLD AUTO: 1.2 % (ref 0.3–6.2)
ERYTHROCYTE [DISTWIDTH] IN BLOOD BY AUTOMATED COUNT: 14 % (ref 12.3–15.4)
GLOBULIN UR ELPH-MCNC: 2.8 GM/DL
GLUCOSE SERPL-MCNC: 101 MG/DL (ref 65–99)
GLUCOSE UR STRIP-MCNC: NEGATIVE MG/DL
HCT VFR BLD AUTO: 44.1 % (ref 37.5–51)
HGB BLD-MCNC: 14.7 G/DL (ref 13–17.7)
HGB UR QL STRIP.AUTO: ABNORMAL
HOLD SPECIMEN: NORMAL
HOLD SPECIMEN: NORMAL
HYALINE CASTS UR QL AUTO: NORMAL /LPF
IMM GRANULOCYTES # BLD AUTO: 0.02 10*3/MM3 (ref 0–0.05)
IMM GRANULOCYTES NFR BLD AUTO: 0.2 % (ref 0–0.5)
KETONES UR QL STRIP: NEGATIVE
LEUKOCYTE ESTERASE UR QL STRIP.AUTO: NEGATIVE
LIPASE SERPL-CCNC: 72 U/L (ref 13–60)
LYMPHOCYTES # BLD AUTO: 2.55 10*3/MM3 (ref 0.7–3.1)
LYMPHOCYTES NFR BLD AUTO: 28.1 % (ref 19.6–45.3)
MCH RBC QN AUTO: 27.8 PG (ref 26.6–33)
MCHC RBC AUTO-ENTMCNC: 33.3 G/DL (ref 31.5–35.7)
MCV RBC AUTO: 83.5 FL (ref 79–97)
MONOCYTES # BLD AUTO: 0.74 10*3/MM3 (ref 0.1–0.9)
MONOCYTES NFR BLD AUTO: 8.1 % (ref 5–12)
NEUTROPHILS NFR BLD AUTO: 5.65 10*3/MM3 (ref 1.7–7)
NEUTROPHILS NFR BLD AUTO: 62.3 % (ref 42.7–76)
NITRITE UR QL STRIP: NEGATIVE
NRBC BLD AUTO-RTO: 0 /100 WBC (ref 0–0.2)
PH UR STRIP.AUTO: 6 [PH] (ref 5–8)
PLATELET # BLD AUTO: 270 10*3/MM3 (ref 140–450)
PMV BLD AUTO: 12.1 FL (ref 6–12)
POTASSIUM SERPL-SCNC: 4.2 MMOL/L (ref 3.5–5.2)
PROT SERPL-MCNC: 7.6 G/DL (ref 6–8.5)
PROT UR QL STRIP: NEGATIVE
RBC # BLD AUTO: 5.28 10*6/MM3 (ref 4.14–5.8)
RBC # UR STRIP: NORMAL /HPF
REF LAB TEST METHOD: NORMAL
SODIUM SERPL-SCNC: 141 MMOL/L (ref 136–145)
SP GR UR STRIP: 1.02 (ref 1–1.03)
SQUAMOUS #/AREA URNS HPF: NORMAL /HPF
UROBILINOGEN UR QL STRIP: ABNORMAL
WBC # UR STRIP: NORMAL /HPF
WBC NRBC COR # BLD AUTO: 9.08 10*3/MM3 (ref 3.4–10.8)
WHOLE BLOOD HOLD COAG: NORMAL
WHOLE BLOOD HOLD SPECIMEN: NORMAL

## 2024-02-29 PROCEDURE — 25810000003 SODIUM CHLORIDE 0.9 % SOLUTION: Performed by: EMERGENCY MEDICINE

## 2024-02-29 PROCEDURE — 96374 THER/PROPH/DIAG INJ IV PUSH: CPT

## 2024-02-29 PROCEDURE — 80053 COMPREHEN METABOLIC PANEL: CPT | Performed by: EMERGENCY MEDICINE

## 2024-02-29 PROCEDURE — 74176 CT ABD & PELVIS W/O CONTRAST: CPT

## 2024-02-29 PROCEDURE — 83690 ASSAY OF LIPASE: CPT | Performed by: EMERGENCY MEDICINE

## 2024-02-29 PROCEDURE — 96375 TX/PRO/DX INJ NEW DRUG ADDON: CPT

## 2024-02-29 PROCEDURE — 85025 COMPLETE CBC W/AUTO DIFF WBC: CPT | Performed by: EMERGENCY MEDICINE

## 2024-02-29 PROCEDURE — 99284 EMERGENCY DEPT VISIT MOD MDM: CPT

## 2024-02-29 PROCEDURE — 25010000002 KETOROLAC TROMETHAMINE PER 15 MG: Performed by: EMERGENCY MEDICINE

## 2024-02-29 PROCEDURE — 81001 URINALYSIS AUTO W/SCOPE: CPT | Performed by: EMERGENCY MEDICINE

## 2024-02-29 PROCEDURE — 96361 HYDRATE IV INFUSION ADD-ON: CPT

## 2024-02-29 PROCEDURE — 25010000002 ONDANSETRON PER 1 MG: Performed by: EMERGENCY MEDICINE

## 2024-02-29 RX ORDER — SODIUM CHLORIDE 0.9 % (FLUSH) 0.9 %
10 SYRINGE (ML) INJECTION AS NEEDED
Status: DISCONTINUED | OUTPATIENT
Start: 2024-02-29 | End: 2024-02-29 | Stop reason: HOSPADM

## 2024-02-29 RX ORDER — KETOROLAC TROMETHAMINE 15 MG/ML
15 INJECTION, SOLUTION INTRAMUSCULAR; INTRAVENOUS ONCE
Status: COMPLETED | OUTPATIENT
Start: 2024-02-29 | End: 2024-02-29

## 2024-02-29 RX ORDER — ONDANSETRON 2 MG/ML
4 INJECTION INTRAMUSCULAR; INTRAVENOUS ONCE
Status: COMPLETED | OUTPATIENT
Start: 2024-02-29 | End: 2024-02-29

## 2024-02-29 RX ORDER — ONDANSETRON 4 MG/1
4 TABLET, ORALLY DISINTEGRATING ORAL 4 TIMES DAILY PRN
Qty: 20 TABLET | Refills: 0 | Status: SHIPPED | OUTPATIENT
Start: 2024-02-29

## 2024-02-29 RX ADMIN — ONDANSETRON 4 MG: 2 INJECTION INTRAMUSCULAR; INTRAVENOUS at 06:35

## 2024-02-29 RX ADMIN — SODIUM CHLORIDE 1000 ML: 9 INJECTION, SOLUTION INTRAVENOUS at 06:35

## 2024-02-29 RX ADMIN — KETOROLAC TROMETHAMINE 15 MG: 15 INJECTION, SOLUTION INTRAMUSCULAR; INTRAVENOUS at 06:35

## 2024-02-29 NOTE — ED PROVIDER NOTES
Time: 6:30 AM EST  Date of encounter:  2/29/2024  Independent Historian/Clinical History and Information was obtained by:   Patient    History is limited by: N/A    Chief Complaint: flank pain      History of Present Illness:  Patient is a 33 y.o. year old male who presents to the emergency department for evaluation of flank pain.  Patient has a history of Graves' disease, hypertension, kidney stones who presents with complaints of right flank pain.  States that it woke him up today and he is having severe right flank pain.  States he has a history of kidney stones and it feels very similar.  Does report some nausea and vomiting.  No other complaints this time.    HPI    Patient Care Team  Primary Care Provider: Joellen Rojas APRN    Past Medical History:     Allergies   Allergen Reactions    Tetracyclines & Related Hives     Past Medical History:   Diagnosis Date    Graves disease     Hypertension     Hyperthyroidism     Kidney stone      Past Surgical History:   Procedure Laterality Date    KIDNEY STONE SURGERY      LUMBAR EPIDURAL INJECTION N/A 11/10/2021    Procedure: LUMBAR EPIDURAL 1ST VISIT lumbar 5-sacral 1;  Surgeon: Tari Soriano MD;  Location: [facility] [place], [place] MAIN OR;  Service: Pain Management;  Laterality: N/A;    MULTIPLE TOOTH EXTRACTIONS      TONSILLECTOMY AND ADENOIDECTOMY       Family History   Problem Relation Age of Onset    Hypothyroidism Mother     Cancer Father         kidney       Home Medications:  Prior to Admission medications    Medication Sig Start Date End Date Taking? Authorizing Provider   buPROPion XL (WELLBUTRIN XL) 150 MG 24 hr tablet Take 1 tablet by mouth Daily. 4/17/23   Nini Messer MD   busPIRone (BUSPAR) 30 MG tablet Take 1 tablet by mouth 3 (Three) Times a Day. 4/27/23   Nini Messer MD   cyclobenzaprine (FLEXERIL) 10 MG tablet Take 1 tablet by mouth 2 (Two) Times a Day As Needed for Muscle Spasms. 6/5/23   Ninoska Dunlap APRN   DULoxetine (CYMBALTA) 60 MG capsule  "Take 1 capsule by mouth 2 (Two) Times a Day. 23   Nini Messer MD   HYDROcodone-acetaminophen (NORCO) 5-325 MG per tablet Take 1 tablet by mouth Every 8 (Eight) Hours As Needed for Moderate Pain. Temporary supply 23   Veronica Byrd APRN   meloxicam (MOBIC) 15 MG tablet Take 1 tablet by mouth Daily. 23   Veronica Byrd APRN   pregabalin (Lyrica) 200 MG capsule Take 1 capsule by mouth 3 (Three) Times a Day. 23   Veronica Byrd APRN   propranolol (INDERAL) 40 MG tablet  21   Provider, MD Nini        Social History:   Social History     Tobacco Use    Smoking status: Former     Years: 7     Types: Cigarettes     Quit date: 10/28/2018     Years since quittin.3    Smokeless tobacco: Never    Tobacco comments:     quit 3 years ago   Vaping Use    Vaping Use: Never used   Substance Use Topics    Alcohol use: Yes     Comment: socially    Drug use: Never         Review of Systems:  Review of Systems   Gastrointestinal:  Positive for abdominal pain, nausea and vomiting.        Physical Exam:  /93   Pulse 105   Temp 97.4 °F (36.3 °C) (Oral)   Resp 24   Ht 193 cm (76\")   Wt 125 kg (275 lb)   SpO2 100%   BMI 33.47 kg/m²     Physical Exam  Vitals and nursing note reviewed.   Constitutional:       Appearance: Normal appearance. He is diaphoretic.   HENT:      Head: Normocephalic and atraumatic.   Eyes:      General: No scleral icterus.  Cardiovascular:      Rate and Rhythm: Normal rate and regular rhythm.      Heart sounds: Normal heart sounds.   Pulmonary:      Effort: Pulmonary effort is normal.      Breath sounds: Normal breath sounds.   Abdominal:      Palpations: Abdomen is soft.      Tenderness: There is abdominal tenderness.      Comments: Right flank pain   Musculoskeletal:         General: Normal range of motion.      Cervical back: Normal range of motion.   Skin:     Findings: No rash.   Neurological:      General: No focal deficit present.      Mental " Status: He is alert.                  Procedures:  Procedures      Medical Decision Making:      Comorbidities that affect care:    Hypertension    External Notes reviewed:    Reviewed PCP note from 7/5/2023      The following orders were placed and all results were independently analyzed by me:  Orders Placed This Encounter   Procedures    CT Abdomen Pelvis Without Contrast    [place] Draw    Comprehensive Metabolic Panel    Lipase    Urinalysis With Microscopic If Indicated (No Culture) - Urine, Clean Catch    CBC Auto Differential    Urinalysis, Microscopic Only - Urine, Clean Catch    NPO Diet NPO Type: Strict NPO    Undress & Gown    Insert Peripheral IV    CBC & Differential    Green Top (Gel)    Lavender Top    Gold Top - SST    Light Blue Top       Medications Given in the Emergency Department:  Medications   sodium chloride 0.9 % flush 10 mL (has no administration in time range)   ketorolac (TORADOL) injection 15 mg (15 mg Intravenous Given 2/29/24 0635)   sodium chloride 0.9 % bolus 1,000 mL (1,000 mL Intravenous New Bag 2/29/24 0635)   ondansetron (ZOFRAN) injection 4 mg (4 mg Intravenous Given 2/29/24 0635)        ED Course:         Labs:    Lab Results (last 24 hours)       Procedure Component Value Units Date/Time    CBC & Differential [[id number]]  (Abnormal) Collected: 02/29/24 0628    Specimen: Blood Updated: 02/29/24 0637    Narrative:      The following orders were created for panel order CBC & Differential.  Procedure                               Abnormality         Status                     ---------                               -----------         ------                     CBC Auto Differential[[id number]]        Abnormal            Final result                 Please view results for these tests on the individual orders.    Comprehensive Metabolic Panel [[id number]]  (Abnormal) Collected: 02/29/24 0628    Specimen: Blood Updated: 02/29/24 0701     Glucose 101 mg/dL      BUN 11 mg/dL       Creatinine 1.37 mg/dL      Sodium 141 mmol/L      Potassium 4.2 mmol/L      Comment: Slight hemolysis detected by analyzer. Result may be falsely elevated.        Chloride 105 mmol/L      CO2 21.5 mmol/L      Calcium 9.6 mg/dL      Total Protein 7.6 g/dL      Albumin 4.8 g/dL      ALT (SGPT) 11 U/L      AST (SGOT) 24 U/L      Alkaline Phosphatase 109 U/L      Total Bilirubin 0.3 mg/dL      Globulin 2.8 gm/dL      A/G Ratio 1.7 g/dL      BUN/Creatinine Ratio 8.0     Anion Gap 14.5 mmol/L      eGFR 69.0 mL/min/1.73     Narrative:      GFR Normal >60  Chronic Kidney Disease <60  Kidney Failure <15      Lipase [[id number]]  (Abnormal) Collected: 02/29/24 0628    Specimen: Blood Updated: 02/29/24 0701     Lipase 72 U/L     CBC Auto Differential [[id number]]  (Abnormal) Collected: 02/29/24 0628    Specimen: Blood Updated: 02/29/24 0637     WBC 9.08 10*3/mm3      RBC 5.28 10*6/mm3      Hemoglobin 14.7 g/dL      Hematocrit 44.1 %      MCV 83.5 fL      MCH 27.8 pg      MCHC 33.3 g/dL      RDW 14.0 %      RDW-SD 42.6 fl      MPV 12.1 fL      Platelets 270 10*3/mm3      Neutrophil % 62.3 %      Lymphocyte % 28.1 %      Monocyte % 8.1 %      Eosinophil % 1.2 %      Basophil % 0.1 %      Immature Grans % 0.2 %      Neutrophils, Absolute 5.65 10*3/mm3      Lymphocytes, Absolute 2.55 10*3/mm3      Monocytes, Absolute 0.74 10*3/mm3      Eosinophils, Absolute 0.11 10*3/mm3      Basophils, Absolute 0.01 10*3/mm3      Immature Grans, Absolute 0.02 10*3/mm3      nRBC 0.0 /100 WBC     Urinalysis With Microscopic If Indicated (No Culture) - Urine, Clean Catch [[id number]]  (Abnormal) Collected: 02/29/24 0739    Specimen: Urine, Clean Catch Updated: 02/29/24 0749     Color, UA Yellow     Appearance, UA Clear     pH, UA 6.0     Specific Gravity, UA 1.023     Glucose, UA Negative     Ketones, UA Negative     Bilirubin, UA Negative     Blood, UA Trace     Protein, UA Negative     Leuk Esterase, UA Negative     Nitrite, UA Negative      Urobilinogen, UA 0.2 E.U./dL    Urinalysis, Microscopic Only - Urine, Clean Catch [[id number]] Collected: 02/29/24 0739    Specimen: Urine, Clean Catch Updated: 02/29/24 0749     RBC, UA 0-2 /HPF      WBC, UA 0-2 /HPF      Bacteria, UA None Seen /HPF      Squamous Epithelial Cells, UA 0-2 /HPF      Hyaline Casts, UA 0-2 /LPF      Methodology Automated Microscopy             Imaging:    CT Abdomen Pelvis Without Contrast    Result Date: 2/29/2024  PROCEDURE: CT ABDOMEN PELVIS WO CONTRAST  COMPARISON: [facility], CT, CT ABDOMEN PELVIS WO CONTRAST, 7/10/2021, 12:04.  INDICATIONS: Abdominal pain, acute, nonlocalized  TECHNIQUE: CT images were created without intravenous contrast.   PROTOCOL:   Standard imaging protocol performed    RADIATION:   DLP: 964.1 mGy*cm   Automated exposure control was utilized to minimize radiation dose.  FINDINGS:    Lung bases:  Limited imaging lung bases is grossly clear.  No free air is noted below the diaphragm.  Organs:  Gallbladder is decompressed and otherwise grossly unremarkable in appearance.  Limited noncontrast imaging of the liver, spleen, pancreas and adrenal glands demonstrate no acute abnormality.  Punctate 1 mm or less calcifications within the kidneys noted bilaterally.  There is no evidence of obstructive uropathy or definite calculi along the visualized X or expected course of the ureters lymph nodes within the julissa hepatic region are again noted likely reactive  GI tract:  Colon is grossly unremarkable in appearance.  The appendix is visualized and appears within normal limits.  The ileocecal valve is unremarkable.  Limited noncontrast imaging of the stomach and small bowel are unremarkable in appearance.  There is no suspicious mesenteric adenopathy or fluid collection  Pelvis:  Urinary bladder, prostate and pelvic structures demonstrate no acute abnormality.  No suspicious pelvic adenopathy or fluid collections are noted.  Retroperitoneum:  Aorta is normal  in caliber.  No suspicious retroperitoneal adenopathy noted  Bones and soft tissues:  No acute osseous abnormality         1. Bilateral nonobstructing renal calculi 2. No acute intra-abdominal or intrapelvic abnormality otherwise noted     MARA CHARLES MD       Electronically Signed and Approved By: MARA CHARLES MD on 2/29/2024 at 7:03                Differential Diagnosis and Discussion:    Flank Pain: Differential diagnosis includes but is not limited to kidney stones, pyelonephritis, musculoskeletal disorders, renal infarction, urinary tract infection, hydronephrosis, radiculopathy, aortic aneurysm, renal cell carcinoma.    All labs were reviewed and interpreted by me.  CT scan radiology impression was interpreted by me.    MDM     Amount and/or Complexity of Data Reviewed  Clinical lab tests: reviewed  Tests in the radiology section of CPT®: reviewed       Patient with right-sided flank pain.  Has a history of kidney stones and presents with right flank pain as well as some diaphoresis.  Appear to be clinically have a kidney stone.  He does report he may have passed that just prior to arrival.  He does report improvement in pain with the Toradol.  He does report he still having some burning and some blood in his urine.  His urine appears to be clear at this time.  Does have some blood noted on UA but the patient is clinically improved at this time.  Could be likely passed stone at this time but no other acute findings noted.  Will send home with nausea medicine and have the patient follow-up with urology.  Andres MCLAUGHLIN.          Patient Care Considerations:          Consultants/Shared Management Plan:    None    Social Determinants of Health:    Patient is independent, reliable, and has access to care.       Disposition and Care Coordination:    Discharged: The patient is suitable and stable for discharge with no need for consideration of admission.    I have explained the patient´s condition, diagnoses and  treatment plan based on the information available to me at this time. I have answered questions and addressed any concerns. The patient has a good  understanding of the patient´s diagnosis, condition, and treatment plan as can be expected at this point. The vital signs have been stable. The patient´s condition is stable and appropriate for discharge from the emergency department.      The patient will pursue further outpatient evaluation with the primary care physician or other designated or consulting physician as outlined in the discharge instructions. They are agreeable to this plan of care and follow-up instructions have been explained in detail. The patient has received these instructions in written format and have expressed an understanding of the discharge instructions. The patient is aware that any significant change in condition or worsening of symptoms should prompt an immediate return to this or the closest emergency department or call to 911.      Final diagnoses:   Flank pain   Renal colic on right side        ED Disposition       ED Disposition   Discharge    Condition   Stable    Comment   --               This medical record created using voice recognition software.             Keon Macario MD  02/29/24 0858

## 2024-02-29 NOTE — Clinical Note
[facility] EMERGENCY ROOM  [address] [place] [address]  Phone: [phone]  Fax: [phone]    Javier Arriola was seen and treated in our emergency department on 2/29/2024.  He may return to work on 03/03/2024.         Thank you for choosing [facility].    Keon Macario MD

## 2024-08-01 ENCOUNTER — HOSPITAL ENCOUNTER (EMERGENCY)
Facility: HOSPITAL | Age: 34
Discharge: HOME | End: 2024-08-01
Attending: EMERGENCY MEDICINE
Payer: COMMERCIAL

## 2024-08-01 ENCOUNTER — APPOINTMENT (OUTPATIENT)
Dept: ULTRASOUND IMAGING | Facility: HOSPITAL | Age: 34
End: 2024-08-01
Payer: COMMERCIAL

## 2024-08-01 VITALS
DIASTOLIC BLOOD PRESSURE: 102 MMHG | RESPIRATION RATE: 18 BRPM | HEART RATE: 73 BPM | HEIGHT: 76 IN | BODY MASS INDEX: 30.63 KG/M2 | SYSTOLIC BLOOD PRESSURE: 148 MMHG | OXYGEN SATURATION: 97 % | TEMPERATURE: 98.2 F | WEIGHT: 251.54 LBS

## 2024-08-01 DIAGNOSIS — K29.70 GASTRITIS WITHOUT BLEEDING, UNSPECIFIED CHRONICITY, UNSPECIFIED GASTRITIS TYPE: Primary | ICD-10-CM

## 2024-08-01 LAB
ALBUMIN SERPL-MCNC: 4.6 G/DL (ref 3.5–5.2)
ALBUMIN/GLOB SERPL: 1.6 G/DL
ALP SERPL-CCNC: 117 U/L (ref 39–117)
ALT SERPL W P-5'-P-CCNC: 7 U/L (ref 1–41)
ANION GAP SERPL CALCULATED.3IONS-SCNC: 12.8 MMOL/L (ref 5–15)
AST SERPL-CCNC: 32 U/L (ref 1–40)
BASOPHILS # BLD AUTO: 0 10*3/MM3 (ref 0–0.2)
BASOPHILS NFR BLD AUTO: 0 % (ref 0–1.5)
BILIRUB SERPL-MCNC: 0.5 MG/DL (ref 0–1.2)
BILIRUB UR QL STRIP: NEGATIVE
BUN SERPL-MCNC: 13 MG/DL (ref 6–20)
BUN/CREAT SERPL: 9.6 (ref 7–25)
CALCIUM SPEC-SCNC: 9.8 MG/DL (ref 8.6–10.5)
CHLORIDE SERPL-SCNC: 100 MMOL/L (ref 98–107)
CLARITY UR: CLEAR
CO2 SERPL-SCNC: 22.2 MMOL/L (ref 22–29)
COLOR UR: YELLOW
CREAT SERPL-MCNC: 1.35 MG/DL (ref 0.76–1.27)
DEPRECATED RDW RBC AUTO: 39.5 FL (ref 37–54)
EGFRCR SERPLBLD CKD-EPI 2021: 69.8 ML/MIN/1.73
EOSINOPHIL # BLD AUTO: 0 10*3/MM3 (ref 0–0.4)
EOSINOPHIL NFR BLD AUTO: 0 % (ref 0.3–6.2)
ERYTHROCYTE [DISTWIDTH] IN BLOOD BY AUTOMATED COUNT: 13.4 % (ref 12.3–15.4)
GLOBULIN UR ELPH-MCNC: 2.8 GM/DL
GLUCOSE SERPL-MCNC: 107 MG/DL (ref 65–99)
GLUCOSE UR STRIP-MCNC: NEGATIVE MG/DL
HCT VFR BLD AUTO: 44.8 % (ref 37.5–51)
HGB BLD-MCNC: 15 G/DL (ref 13–17.7)
HGB UR QL STRIP.AUTO: NEGATIVE
HOLD SPECIMEN: NORMAL
HOLD SPECIMEN: NORMAL
IMM GRANULOCYTES # BLD AUTO: 0.01 10*3/MM3 (ref 0–0.05)
IMM GRANULOCYTES NFR BLD AUTO: 0.1 % (ref 0–0.5)
KETONES UR QL STRIP: NEGATIVE
LEUKOCYTE ESTERASE UR QL STRIP.AUTO: NEGATIVE
LIPASE SERPL-CCNC: 30 U/L (ref 13–60)
LYMPHOCYTES # BLD AUTO: 1.15 10*3/MM3 (ref 0.7–3.1)
LYMPHOCYTES NFR BLD AUTO: 14.7 % (ref 19.6–45.3)
MCH RBC QN AUTO: 27.5 PG (ref 26.6–33)
MCHC RBC AUTO-ENTMCNC: 33.5 G/DL (ref 31.5–35.7)
MCV RBC AUTO: 82.1 FL (ref 79–97)
MONOCYTES # BLD AUTO: 0.44 10*3/MM3 (ref 0.1–0.9)
MONOCYTES NFR BLD AUTO: 5.6 % (ref 5–12)
NEUTROPHILS NFR BLD AUTO: 6.24 10*3/MM3 (ref 1.7–7)
NEUTROPHILS NFR BLD AUTO: 79.6 % (ref 42.7–76)
NITRITE UR QL STRIP: NEGATIVE
NRBC BLD AUTO-RTO: 0 /100 WBC (ref 0–0.2)
PH UR STRIP.AUTO: 6.5 [PH] (ref 5–8)
PLATELET # BLD AUTO: 215 10*3/MM3 (ref 140–450)
PMV BLD AUTO: 11.4 FL (ref 6–12)
POTASSIUM SERPL-SCNC: 3.8 MMOL/L (ref 3.5–5.2)
PROT SERPL-MCNC: 7.4 G/DL (ref 6–8.5)
PROT UR QL STRIP: NEGATIVE
RBC # BLD AUTO: 5.46 10*6/MM3 (ref 4.14–5.8)
SODIUM SERPL-SCNC: 135 MMOL/L (ref 136–145)
SP GR UR STRIP: 1.01 (ref 1–1.03)
UROBILINOGEN UR QL STRIP: NORMAL
WBC NRBC COR # BLD AUTO: 7.84 10*3/MM3 (ref 3.4–10.8)
WHOLE BLOOD HOLD COAG: NORMAL
WHOLE BLOOD HOLD SPECIMEN: NORMAL

## 2024-08-01 PROCEDURE — 25010000002 KETOROLAC TROMETHAMINE PER 15 MG: Performed by: EMERGENCY MEDICINE

## 2024-08-01 PROCEDURE — 76705 ECHO EXAM OF ABDOMEN: CPT

## 2024-08-01 PROCEDURE — 80053 COMPREHEN METABOLIC PANEL: CPT | Performed by: EMERGENCY MEDICINE

## 2024-08-01 PROCEDURE — 83690 ASSAY OF LIPASE: CPT | Performed by: EMERGENCY MEDICINE

## 2024-08-01 PROCEDURE — 85025 COMPLETE CBC W/AUTO DIFF WBC: CPT | Performed by: EMERGENCY MEDICINE

## 2024-08-01 PROCEDURE — 99284 EMERGENCY DEPT VISIT MOD MDM: CPT

## 2024-08-01 PROCEDURE — 25810000003 SODIUM CHLORIDE 0.9 % SOLUTION: Performed by: EMERGENCY MEDICINE

## 2024-08-01 PROCEDURE — 81003 URINALYSIS AUTO W/O SCOPE: CPT | Performed by: EMERGENCY MEDICINE

## 2024-08-01 PROCEDURE — 96374 THER/PROPH/DIAG INJ IV PUSH: CPT

## 2024-08-01 PROCEDURE — 96375 TX/PRO/DX INJ NEW DRUG ADDON: CPT

## 2024-08-01 PROCEDURE — 25010000002 ONDANSETRON PER 1 MG: Performed by: EMERGENCY MEDICINE

## 2024-08-01 RX ORDER — ONDANSETRON 4 MG/1
4 TABLET, ORALLY DISINTEGRATING ORAL EVERY 8 HOURS PRN
Qty: 15 TABLET | Refills: 0 | Status: SHIPPED | OUTPATIENT
Start: 2024-08-01

## 2024-08-01 RX ORDER — ONDANSETRON 2 MG/ML
4 INJECTION INTRAMUSCULAR; INTRAVENOUS ONCE
Status: COMPLETED | OUTPATIENT
Start: 2024-08-01 | End: 2024-08-01

## 2024-08-01 RX ORDER — KETOROLAC TROMETHAMINE 30 MG/ML
30 INJECTION, SOLUTION INTRAMUSCULAR; INTRAVENOUS ONCE
Status: COMPLETED | OUTPATIENT
Start: 2024-08-01 | End: 2024-08-01

## 2024-08-01 RX ORDER — SODIUM CHLORIDE 0.9 % (FLUSH) 0.9 %
10 SYRINGE (ML) INJECTION AS NEEDED
Status: DISCONTINUED | OUTPATIENT
Start: 2024-08-01 | End: 2024-08-01 | Stop reason: HOSPADM

## 2024-08-01 RX ORDER — PANTOPRAZOLE SODIUM 40 MG/1
40 TABLET, DELAYED RELEASE ORAL DAILY
Qty: 30 TABLET | Refills: 0 | Status: SHIPPED | OUTPATIENT
Start: 2024-08-01

## 2024-08-01 RX ADMIN — ONDANSETRON 4 MG: 2 INJECTION INTRAMUSCULAR; INTRAVENOUS at 08:07

## 2024-08-01 RX ADMIN — SODIUM CHLORIDE 1000 ML: 9 INJECTION, SOLUTION INTRAVENOUS at 08:07

## 2024-08-01 RX ADMIN — KETOROLAC TROMETHAMINE 30 MG: 30 INJECTION, SOLUTION INTRAMUSCULAR; INTRAVENOUS at 08:07

## 2024-08-01 NOTE — ED PROVIDER NOTES
Time: 8:17 AM EDT  Date of encounter:  8/1/2024  Independent Historian/Clinical History and Information was obtained by:   Patient    History is limited by: N/A    Chief Complaint: Abdominal pain      History of Present Illness:  Patient is a 34 y.o. year old male who presents to the emergency department for evaluation of abdominal pain with nausea and no vomiting.  Patient denies chest pain or shortness of breath.  Patient has no cough or hemoptysis.  Patient denies dysuria and urinary frequency.    HPI    Patient Care Team  Primary Care Provider: Joellen Rojas APRN    Past Medical History:     Allergies   Allergen Reactions    Tetracyclines & Related Hives     Past Medical History:   Diagnosis Date    Graves disease     Hypertension     Hyperthyroidism     Kidney stone      Past Surgical History:   Procedure Laterality Date    KIDNEY STONE SURGERY      LUMBAR EPIDURAL INJECTION N/A 11/10/2021    Procedure: LUMBAR EPIDURAL 1ST VISIT lumbar 5-sacral 1;  Surgeon: Tari Soriano MD;  [place] [facility] [place]  Service: Pain Management;  Laterality: N/A;    MULTIPLE TOOTH EXTRACTIONS      TONSILLECTOMY AND ADENOIDECTOMY       Family History   Problem Relation Age of Onset    Hypothyroidism Mother     Cancer Father         kidney       Home Medications:  Prior to Admission medications    Medication Sig Start Date End Date Taking? Authorizing Provider   buPROPion XL (WELLBUTRIN XL) 150 MG 24 hr tablet Take 1 tablet by mouth Daily. 4/17/23   Nini Messer MD   busPIRone (BUSPAR) 30 MG tablet Take 1 tablet by mouth 3 (Three) Times a Day. 4/27/23   Nini Messer MD   cyclobenzaprine (FLEXERIL) 10 MG tablet Take 1 tablet by mouth 2 (Two) Times a Day As Needed for Muscle Spasms. 6/5/23   Ninoska Dunlap APRN   DULoxetine (CYMBALTA) 60 MG capsule Take 1 capsule by mouth 2 (Two) Times a Day. 2/25/23   Nini Messer MD   HYDROcodone-acetaminophen (NORCO) 5-325 MG per tablet Take 1 tablet by mouth Every 8  "(Eight) Hours As Needed for Moderate Pain. Temporary supply 23   Veronica Byrd APRN   meloxicam (MOBIC) 15 MG tablet Take 1 tablet by mouth Daily. 23   Veronica Byrd APRN   ondansetron ODT (ZOFRAN-ODT) 4 MG disintegrating tablet Place 1 tablet on the tongue 4 (Four) Times a Day As Needed for Vomiting or Nausea. 24   Keon Macario MD   pregabalin (Lyrica) 200 MG capsule Take 1 capsule by mouth 3 (Three) Times a Day. 23   Veronica Byrd APRN   propranolol (INDERAL) 40 MG tablet  21   Provider, MD Nini        Social History:   Social History     Tobacco Use    Smoking status: Former     Current packs/day: 0.00     Types: Cigarettes     Start date: 10/28/2011     Quit date: 10/28/2018     Years since quittin.7    Smokeless tobacco: Never    Tobacco comments:     quit 3 years ago   Vaping Use    Vaping status: Never Used   Substance Use Topics    Alcohol use: Yes     Comment: socially    Drug use: Never         Review of Systems:  Review of Systems   Constitutional:  Negative for chills and fever.   HENT:  Negative for congestion, rhinorrhea and sore throat.    Eyes:  Negative for pain and visual disturbance.   Respiratory:  Negative for apnea, cough, chest tightness and shortness of breath.    Cardiovascular:  Negative for chest pain and palpitations.   Gastrointestinal:  Positive for abdominal pain. Negative for diarrhea, nausea and vomiting.   Genitourinary:  Negative for difficulty urinating and dysuria.   Musculoskeletal:  Negative for joint swelling and myalgias.   Skin:  Negative for color change.   Neurological:  Negative for seizures and headaches.   Psychiatric/Behavioral: Negative.     All other systems reviewed and are negative.       Physical Exam:  BP (!) 131/107   Pulse 73   Temp 98.2 °F (36.8 °C) (Oral)   Resp 18   Ht 193 cm (76\")   Wt 114 kg (251 lb 8.7 oz)   SpO2 97%   BMI 30.62 kg/m²     Physical Exam  Vitals and nursing note reviewed. "   Constitutional:       General: He is not in acute distress.     Appearance: Normal appearance. He is not toxic-appearing.   HENT:      Head: Normocephalic and atraumatic.      Jaw: There is normal jaw occlusion.   Eyes:      General: Lids are normal.      Extraocular Movements: Extraocular movements intact.      Conjunctiva/sclera: Conjunctivae normal.      Pupils: Pupils are equal, round, and reactive to light.   Cardiovascular:      Rate and Rhythm: Normal rate and regular rhythm.      Pulses: Normal pulses.      Heart sounds: Normal heart sounds.   Pulmonary:      Effort: Pulmonary effort is normal. No respiratory distress.      Breath sounds: Normal breath sounds. No wheezing or rhonchi.   Abdominal:      General: Abdomen is flat.      Palpations: Abdomen is soft.      Tenderness: There is no abdominal tenderness in the right upper quadrant. There is no guarding or rebound.   Musculoskeletal:         General: Normal range of motion.      Cervical back: Normal range of motion and neck supple.      Right lower leg: No edema.      Left lower leg: No edema.   Skin:     General: Skin is warm and dry.   Neurological:      Mental Status: He is alert and oriented to person, place, and time. Mental status is at baseline.   Psychiatric:         Mood and Affect: Mood normal.                  Procedures:  Procedures      Medical Decision Making:      Comorbidities that affect care:    None    External Notes reviewed:    Previous Clinic Note: Patient was last seen in clinic for anxiety and depression      The following orders were placed and all results were independently analyzed by me:  Orders Placed This Encounter   Procedures    US Gallbladder    Sebring Draw    Comprehensive Metabolic Panel    Lipase    Urinalysis With Microscopic If Indicated (No Culture) - Urine, Clean Catch    CBC Auto Differential    NPO Diet NPO Type: Strict NPO    Undress & Gown    Insert Peripheral IV    CBC & Differential    Green Top (Gel)     Lavender Top    Gold Top - SST    Light Blue Top       Medications Given in the Emergency Department:  Medications   sodium chloride 0.9 % flush 10 mL (has no administration in time range)   sodium chloride 0.9 % bolus 1,000 mL (1,000 mL Intravenous New Bag 8/1/24 0807)   ketorolac (TORADOL) injection 30 mg (30 mg Intravenous Given 8/1/24 0807)   ondansetron (ZOFRAN) injection 4 mg (4 mg Intravenous Given 8/1/24 0807)        ED Course:         Labs:    Lab Results (last 24 hours)       Procedure Component Value Units Date/Time    CBC & Differential [[id number]]  (Abnormal) Collected: 08/01/24 0736    Specimen: Blood Updated: 08/01/24 0746    Narrative:      The following orders were created for panel order CBC & Differential.  Procedure                               Abnormality         Status                     ---------                               -----------         ------                     CBC Auto Differential[[id number]]        Abnormal            Final result                 Please view results for these tests on the individual orders.    Comprehensive Metabolic Panel [[id number]]  (Abnormal) Collected: 08/01/24 0736    Specimen: Blood Updated: 08/01/24 0802     Glucose 107 mg/dL      BUN 13 mg/dL      Creatinine 1.35 mg/dL      Sodium 135 mmol/L      Potassium 3.8 mmol/L      Chloride 100 mmol/L      CO2 22.2 mmol/L      Calcium 9.8 mg/dL      Total Protein 7.4 g/dL      Albumin 4.6 g/dL      ALT (SGPT) 7 U/L      AST (SGOT) 32 U/L      Alkaline Phosphatase 117 U/L      Total Bilirubin 0.5 mg/dL      Globulin 2.8 gm/dL      A/G Ratio 1.6 g/dL      BUN/Creatinine Ratio 9.6     Anion Gap 12.8 mmol/L      eGFR 69.8 mL/min/1.73     Narrative:      GFR Normal >60  Chronic Kidney Disease <60  Kidney Failure <15      Lipase [[id number]]  (Normal) Collected: 08/01/24 0736    Specimen: Blood Updated: 08/01/24 0802     Lipase 30 U/L     CBC Auto Differential [[id number]]  (Abnormal) Collected: 08/01/24 0736     Specimen: Blood Updated: 08/01/24 0746     WBC 7.84 10*3/mm3      RBC 5.46 10*6/mm3      Hemoglobin 15.0 g/dL      Hematocrit 44.8 %      MCV 82.1 fL      MCH 27.5 pg      MCHC 33.5 g/dL      RDW 13.4 %      RDW-SD 39.5 fl      MPV 11.4 fL      Platelets 215 10*3/mm3      Neutrophil % 79.6 %      Lymphocyte % 14.7 %      Monocyte % 5.6 %      Eosinophil % 0.0 %      Basophil % 0.0 %      Immature Grans % 0.1 %      Neutrophils, Absolute 6.24 10*3/mm3      Lymphocytes, Absolute 1.15 10*3/mm3      Monocytes, Absolute 0.44 10*3/mm3      Eosinophils, Absolute 0.00 10*3/mm3      Basophils, Absolute 0.00 10*3/mm3      Immature Grans, Absolute 0.01 10*3/mm3      nRBC 0.0 /100 WBC     Urinalysis With Microscopic If Indicated (No Culture) - Urine, Clean Catch [[id number]]  (Normal) Collected: 08/01/24 0737    Specimen: Urine, Clean Catch Updated: 08/01/24 0755     Color, UA Yellow     Appearance, UA Clear     pH, UA 6.5     Specific Gravity, UA 1.006     Glucose, UA Negative     Ketones, UA Negative     Bilirubin, UA Negative     Blood, UA Negative     Protein, UA Negative     Leuk Esterase, UA Negative     Nitrite, UA Negative     Urobilinogen, UA 1.0 E.U./dL    Narrative:      Urine microscopic not indicated.             Imaging:    US Gallbladder    Result Date: 8/1/2024  US GALLBLADDER Date of Exam: 8/1/2024 7:58 AM EDT Indication: Evaluate gallbladder pain. Comparison: CT abdomen pelvis February 29, 2024 Technique: Grayscale and color Doppler ultrasound evaluation of the right upper quadrant was performed. Findings: The visualized portions of the pancreas do not appear unusual. Liver is homogeneous in echotexture. Doppler waveform for the portal vein and hepatic vein revealed patency of these vessels. No definite gallstones are seen in the gallbladder. The gallbladder wall is not thickened. Common bile duct measures 0.43 cm. Right kidney measures 11 cm in length. There is no hydronephrosis.     Impression: 1.No  significant underlying abnormality. Electronically Signed: Jonathan Adkins MD  8/1/2024 8:34 AM EDT  Workstation ID: [id number]       Differential Diagnosis and Discussion:    Abdominal Pain: Based on the patient's signs and symptoms, I considered abdominal aortic aneurysm, small bowel obstruction, pancreatitis, acute cholecystitis, acute appendecitis, peptic ulcer disease, gastritis, colitis, endocrine disorders, irritable bowel syndrome and other differential diagnosis an etiology of the patient's abdominal pain.    All labs were reviewed and interpreted by me.  Ultrasound impression was interpreted by me.     MDM     The patient is resting comfortably and feels better, is alert and in no distress.  The patient´s CBC that was reviewed and interpreted by me shows no abnormalities of critical concern. Of note, there is no anemia requiring a blood transfusion and the platelet count is acceptable.  The patient´s CMP that was reviewed and interpretted by me shows no abnormalities of critical concern. Of note, the patient´s sodium and potassium are acceptable. The patient´s liver enzymes are unremarkable. The patient´s renal function (creatinine) is preserved. The patient has a normal anion gap.  Ultrasound is negative for gallstones.  Repeat examination is unremarkable and benign; in particular, there's no discomfort at McBurney's point and there is no pulsatile mass. The patient has passed a po challenge in the ED and has no intractable vomiting. The history, exam, diagnostic testing, and current condition does not suggest acute appendicitis, bowel instruction, acute cholecystitis, bowel perforation, major gastrointestinal bleeding, severe diverticulitis, abdominal aortic aneurysm, mesenteric ischemia, volvulus, sepsis, or other significant pathology that warrants further testing, continued ED treatment, admission, for surgical evaluation at this point. The vital signs have been stable. The patient´s symptoms are  consistent with gastritis. The patient has no peritoneal signs consistent with a perforated ulcer. The patient was counseled to avoid NSAIDS, coffee, spicy foods, alcohol, smoking and other irritants of the stomach. The patient was advised that they may seek  of a gastroenterologist for an outpatient endoscopy. The patient does not have uncontrollable pain, intractable vomiting, or other significant symptoms. The patient's condition is stable and appropriate for discharge from the emergency department.          Patient Care Considerations:    ANTIBIOTICS: I considered prescribing antibiotics as an outpatient however no bacterial focus of infection was found.      Consultants/Shared Management Plan:    None    Social Determinants of Health:    Patient is independent, reliable, and has access to care.       Disposition and Care Coordination:    Discharged: I considered escalation of care by admitting this patient to the hospital, however patient reports improvement of his symptoms with ED treatment.    I have explained the patient´s condition, diagnoses and treatment plan based on the information available to me at this time. I have answered questions and addressed any concerns. The patient has a good  understanding of the patient´s diagnosis, condition, and treatment plan as can be expected at this point. The vital signs have been stable. The patient´s condition is stable and appropriate for discharge from the emergency department.      The patient will pursue further outpatient evaluation with the primary care physician or other designated or consulting physician as outlined in the discharge instructions. They are agreeable to this plan of care and follow-up instructions have been explained in detail. The patient has received these instructions in written format and has expressed an understanding of the discharge instructions. The patient is aware that any significant change in condition or worsening of  symptoms should prompt an immediate return to this or the closest emergency department or call to 911.  I have explained discharge medications and the need for follow up with the patient/caretakers. This was also printed in the discharge instructions. Patient was discharged with the following medications and follow up:      Medication List        New Prescriptions      pantoprazole 40 MG EC tablet  Commonly known as: PROTONIX  Take 1 tablet by mouth Daily.            Changed      ondansetron ODT 4 MG disintegrating tablet  Commonly known as: ZOFRAN-ODT  Place 1 tablet on the tongue Every 8 (Eight) Hours As Needed for Nausea or Vomiting.  What changed: when to take this               Where to Get Your Medications        [id number] - [place], [place] - [address] AT [facility] - [phone]  - [phone]   [address] [place] [address]      Phone: [phone]   ondansetron ODT 4 MG disintegrating tablet  pantoprazole 40 MG EC tablet      Joellen Rojas, CELI  [address] [address] [phone]    In 2 days         Final diagnoses:   Gastritis without bleeding, unspecified chronicity, unspecified gastritis type        ED Disposition       ED Disposition   Discharge    Condition   Stable    Comment   --               This medical record created using voice recognition software.             Sujatha Argueta MD  08/01/24 0881

## 2024-11-18 ENCOUNTER — HOSPITAL ENCOUNTER (EMERGENCY)
Facility: HOSPITAL | Age: 34
Discharge: HOME | End: 2024-11-18
Attending: EMERGENCY MEDICINE | Admitting: EMERGENCY MEDICINE
Payer: COMMERCIAL

## 2024-11-18 ENCOUNTER — APPOINTMENT (OUTPATIENT)
Dept: GENERAL RADIOLOGY | Facility: HOSPITAL | Age: 34
End: 2024-11-18
Payer: COMMERCIAL

## 2024-11-18 VITALS
RESPIRATION RATE: 10 BRPM | BODY MASS INDEX: 31.79 KG/M2 | SYSTOLIC BLOOD PRESSURE: 134 MMHG | DIASTOLIC BLOOD PRESSURE: 87 MMHG | WEIGHT: 261.02 LBS | HEART RATE: 84 BPM | TEMPERATURE: 98.1 F | OXYGEN SATURATION: 100 % | HEIGHT: 76 IN

## 2024-11-18 DIAGNOSIS — R07.9 NONSPECIFIC CHEST PAIN: Primary | ICD-10-CM

## 2024-11-18 LAB
ALBUMIN SERPL-MCNC: 4.8 G/DL (ref 3.5–5.2)
ALBUMIN/GLOB SERPL: 1.5 G/DL
ALP SERPL-CCNC: 104 U/L (ref 39–117)
ALT SERPL W P-5'-P-CCNC: 9 U/L (ref 1–41)
ANION GAP SERPL CALCULATED.3IONS-SCNC: 15.2 MMOL/L (ref 5–15)
AST SERPL-CCNC: 21 U/L (ref 1–40)
BASOPHILS # BLD AUTO: 0 10*3/MM3 (ref 0–0.2)
BASOPHILS NFR BLD AUTO: 0 % (ref 0–1.5)
BILIRUB SERPL-MCNC: 0.4 MG/DL (ref 0–1.2)
BUN SERPL-MCNC: 8 MG/DL (ref 6–20)
BUN/CREAT SERPL: 5.8 (ref 7–25)
CALCIUM SPEC-SCNC: 9.7 MG/DL (ref 8.6–10.5)
CHLORIDE SERPL-SCNC: 100 MMOL/L (ref 98–107)
CO2 SERPL-SCNC: 21.8 MMOL/L (ref 22–29)
CREAT SERPL-MCNC: 1.37 MG/DL (ref 0.76–1.27)
D DIMER PPP FEU-MCNC: <0.27 MCGFEU/ML (ref 0–0.5)
DEPRECATED RDW RBC AUTO: 36.3 FL (ref 37–54)
EGFRCR SERPLBLD CKD-EPI 2021: 68.6 ML/MIN/1.73
EOSINOPHIL # BLD AUTO: 0 10*3/MM3 (ref 0–0.4)
EOSINOPHIL NFR BLD AUTO: 0 % (ref 0.3–6.2)
ERYTHROCYTE [DISTWIDTH] IN BLOOD BY AUTOMATED COUNT: 12.2 % (ref 12.3–15.4)
GEN 5 2HR TROPONIN T REFLEX: 6 NG/L
GLOBULIN UR ELPH-MCNC: 3.1 GM/DL
GLUCOSE SERPL-MCNC: 99 MG/DL (ref 65–99)
HCT VFR BLD AUTO: 43.3 % (ref 37.5–51)
HGB BLD-MCNC: 14.4 G/DL (ref 13–17.7)
HOLD SPECIMEN: NORMAL
HOLD SPECIMEN: NORMAL
IMM GRANULOCYTES # BLD AUTO: 0 10*3/MM3 (ref 0–0.05)
IMM GRANULOCYTES NFR BLD AUTO: 0 % (ref 0–0.5)
LIPASE SERPL-CCNC: 27 U/L (ref 13–60)
LYMPHOCYTES # BLD AUTO: 1.4 10*3/MM3 (ref 0.7–3.1)
LYMPHOCYTES NFR BLD AUTO: 28.9 % (ref 19.6–45.3)
MAGNESIUM SERPL-MCNC: 1.8 MG/DL (ref 1.6–2.6)
MCH RBC QN AUTO: 27.1 PG (ref 26.6–33)
MCHC RBC AUTO-ENTMCNC: 33.3 G/DL (ref 31.5–35.7)
MCV RBC AUTO: 81.5 FL (ref 79–97)
MONOCYTES # BLD AUTO: 0.35 10*3/MM3 (ref 0.1–0.9)
MONOCYTES NFR BLD AUTO: 7.2 % (ref 5–12)
NEUTROPHILS NFR BLD AUTO: 3.1 10*3/MM3 (ref 1.7–7)
NEUTROPHILS NFR BLD AUTO: 63.9 % (ref 42.7–76)
NRBC BLD AUTO-RTO: 0 /100 WBC (ref 0–0.2)
NT-PROBNP SERPL-MCNC: <36 PG/ML (ref 0–450)
PLATELET # BLD AUTO: 191 10*3/MM3 (ref 140–450)
PMV BLD AUTO: 11.9 FL (ref 6–12)
POTASSIUM SERPL-SCNC: 3.9 MMOL/L (ref 3.5–5.2)
PROT SERPL-MCNC: 7.9 G/DL (ref 6–8.5)
QT INTERVAL: 369 MS
QTC INTERVAL: 440 MS
RBC # BLD AUTO: 5.31 10*6/MM3 (ref 4.14–5.8)
SODIUM SERPL-SCNC: 137 MMOL/L (ref 136–145)
TROPONIN T DELTA: -1 NG/L
TROPONIN T SERPL HS-MCNC: 7 NG/L
TSH SERPL DL<=0.05 MIU/L-ACNC: 4.03 UIU/ML (ref 0.27–4.2)
WBC NRBC COR # BLD AUTO: 4.85 10*3/MM3 (ref 3.4–10.8)
WHOLE BLOOD HOLD COAG: NORMAL
WHOLE BLOOD HOLD SPECIMEN: NORMAL

## 2024-11-18 PROCEDURE — 83880 ASSAY OF NATRIURETIC PEPTIDE: CPT | Performed by: EMERGENCY MEDICINE

## 2024-11-18 PROCEDURE — 85379 FIBRIN DEGRADATION QUANT: CPT | Performed by: EMERGENCY MEDICINE

## 2024-11-18 PROCEDURE — 83690 ASSAY OF LIPASE: CPT | Performed by: EMERGENCY MEDICINE

## 2024-11-18 PROCEDURE — 93010 ELECTROCARDIOGRAM REPORT: CPT | Performed by: STUDENT IN AN ORGANIZED HEALTH CARE EDUCATION/TRAINING PROGRAM

## 2024-11-18 PROCEDURE — 80053 COMPREHEN METABOLIC PANEL: CPT | Performed by: EMERGENCY MEDICINE

## 2024-11-18 PROCEDURE — 99284 EMERGENCY DEPT VISIT MOD MDM: CPT

## 2024-11-18 PROCEDURE — 93005 ELECTROCARDIOGRAM TRACING: CPT

## 2024-11-18 PROCEDURE — 93005 ELECTROCARDIOGRAM TRACING: CPT | Performed by: EMERGENCY MEDICINE

## 2024-11-18 PROCEDURE — 84443 ASSAY THYROID STIM HORMONE: CPT | Performed by: EMERGENCY MEDICINE

## 2024-11-18 PROCEDURE — 84484 ASSAY OF TROPONIN QUANT: CPT | Performed by: EMERGENCY MEDICINE

## 2024-11-18 PROCEDURE — 85025 COMPLETE CBC W/AUTO DIFF WBC: CPT | Performed by: EMERGENCY MEDICINE

## 2024-11-18 PROCEDURE — 71045 X-RAY EXAM CHEST 1 VIEW: CPT

## 2024-11-18 PROCEDURE — 36415 COLL VENOUS BLD VENIPUNCTURE: CPT

## 2024-11-18 PROCEDURE — 83735 ASSAY OF MAGNESIUM: CPT | Performed by: EMERGENCY MEDICINE

## 2024-11-18 RX ORDER — ASPIRIN 81 MG/1
324 TABLET, CHEWABLE ORAL ONCE
Status: COMPLETED | OUTPATIENT
Start: 2024-11-18 | End: 2024-11-18

## 2024-11-18 RX ORDER — PROPRANOLOL HYDROCHLORIDE 40 MG/1
40 TABLET ORAL 2 TIMES DAILY
Qty: 30 TABLET | Refills: 0 | Status: SHIPPED | OUTPATIENT
Start: 2024-11-18

## 2024-11-18 RX ORDER — SODIUM CHLORIDE 0.9 % (FLUSH) 0.9 %
10 SYRINGE (ML) INJECTION AS NEEDED
Status: DISCONTINUED | OUTPATIENT
Start: 2024-11-18 | End: 2024-11-18 | Stop reason: HOSPADM

## 2024-11-18 RX ADMIN — ASPIRIN 324 MG: 81 TABLET, CHEWABLE ORAL at 12:32

## 2024-11-18 NOTE — Clinical Note
[facility] EMERGENCY ROOM  [address] [place] [place] [address]  Phone: [phone]  Fax: [phone]    Javier Arriola was seen and treated in our emergency department on 11/18/2024.  He may return to work on 11/19/2024.         Thank you for choosing [facility].    Christopher Fregoso MD

## 2024-11-18 NOTE — ED PROVIDER NOTES
Time: 1:40 PM EST  Date of encounter:  11/18/2024  Independent Historian/Clinical History and Information was obtained by:   Patient    History is limited by: N/A    Chief Complaint: Chest pain      History of Present Illness:  Patient is a 34 y.o. year old male who presents to the emergency department for evaluation of chest pain starting yesterday.  Patient states he is a .  Pain is substernal but primarily relates palpitations and lightheadedness.  This has occurred in the past he states due to thyroid disease.  Symptoms are worse with ambulation.      Patient Care Team  Primary Care Provider: Joellen Rojas APRN    Past Medical History:     Allergies   Allergen Reactions    Tetracyclines & Related Hives     Past Medical History:   Diagnosis Date    Graves disease     Hypertension     Hyperthyroidism     Kidney stone      Past Surgical History:   Procedure Laterality Date    KIDNEY STONE SURGERY      LUMBAR EPIDURAL INJECTION N/A 11/10/2021    Procedure: LUMBAR EPIDURAL 1ST VISIT lumbar 5-sacral 1;  Surgeon: Tari Soriano MD;  Location: [facility] MAIN OR;  Service: Pain Management;  Laterality: N/A;    MULTIPLE TOOTH EXTRACTIONS      TONSILLECTOMY AND ADENOIDECTOMY       Family History   Problem Relation Age of Onset    Hypothyroidism Mother     Cancer Father         kidney       Home Medications:  Prior to Admission medications    Medication Sig Start Date End Date Taking? Authorizing Provider   propranolol (INDERAL) 40 MG tablet  9/7/21  Yes iNni Messer MD   buPROPion XL (WELLBUTRIN XL) 150 MG 24 hr tablet Take 1 tablet by mouth Daily. 4/17/23   Nini Messer MD   busPIRone (BUSPAR) 30 MG tablet Take 1 tablet by mouth 3 (Three) Times a Day. 4/27/23   Nini Messer MD   cyclobenzaprine (FLEXERIL) 10 MG tablet Take 1 tablet by mouth 2 (Two) Times a Day As Needed for Muscle Spasms. 6/5/23   Ninoska Dunlap APRN   DULoxetine (CYMBALTA) 60 MG capsule Take 1 capsule by mouth 2  (Two) Times a Day. 23   Provider, MD Nini   HYDROcodone-acetaminophen (NORCO) 5-325 MG per tablet Take 1 tablet by mouth Every 8 (Eight) Hours As Needed for Moderate Pain. Temporary supply 23   Veronica Byrd APRN   meloxicam (MOBIC) 15 MG tablet Take 1 tablet by mouth Daily. 23   Veronica Byrd APRN   ondansetron ODT (ZOFRAN-ODT) 4 MG disintegrating tablet Place 1 tablet on the tongue Every 8 (Eight) Hours As Needed for Nausea or Vomiting. 24   Sujatha Argueta MD   pantoprazole (PROTONIX) 40 MG EC tablet Take 1 tablet by mouth Daily. 24   Sujatha Argueta MD   pregabalin (Lyrica) 200 MG capsule Take 1 capsule by mouth 3 (Three) Times a Day. 23   Veronica Byrd APRN        Social History:   Social History     Tobacco Use    Smoking status: Former     Current packs/day: 0.00     Types: Cigarettes     Start date: 10/28/2011     Quit date: 10/28/2018     Years since quittin.0    Smokeless tobacco: Never    Tobacco comments:     quit 3 years ago   Vaping Use    Vaping status: Never Used   Substance Use Topics    Alcohol use: Not Currently     Comment: socially    Drug use: Never         Review of Systems:  Review of Systems   Constitutional:  Negative for chills and fever.   HENT:  Negative for congestion, rhinorrhea and sore throat.    Eyes:  Negative for photophobia.   Respiratory:  Positive for shortness of breath. Negative for apnea, cough and chest tightness.    Cardiovascular:  Positive for chest pain and palpitations.   Gastrointestinal:  Negative for abdominal pain, diarrhea, nausea and vomiting.   Endocrine: Negative.    Genitourinary:  Negative for difficulty urinating and dysuria.   Musculoskeletal:  Negative for back pain, joint swelling and myalgias.   Skin:  Negative for color change and wound.   Allergic/Immunologic: Negative.    Neurological:  Positive for light-headedness. Negative for seizures and headaches.   Psychiatric/Behavioral: Negative.    "  All other systems reviewed and are negative.       Physical Exam:  /87 (BP Location: Left arm, Patient Position: Lying)   Pulse 84   Temp 98.1 °F (36.7 °C) (Oral)   Resp 10   Ht 193 cm (76\")   Wt 118 kg (261 lb 0.4 oz)   SpO2 100%   BMI 31.77 kg/m²     Physical Exam  Vitals and nursing note reviewed.   Constitutional:       General: He is awake.      Appearance: Normal appearance. He is well-developed.   HENT:      Head: Normocephalic and atraumatic.      Nose: Nose normal.      Mouth/Throat:      Mouth: Mucous membranes are moist.   Eyes:      Extraocular Movements: Extraocular movements intact.      Pupils: Pupils are equal, round, and reactive to light.   Cardiovascular:      Rate and Rhythm: Normal rate and regular rhythm.      Heart sounds: Normal heart sounds.   Pulmonary:      Effort: Pulmonary effort is normal. No respiratory distress.      Breath sounds: Normal breath sounds. No wheezing, rhonchi or rales.   Abdominal:      General: Bowel sounds are normal.      Palpations: Abdomen is soft.      Tenderness: There is no abdominal tenderness. There is no guarding or rebound.      Comments: No rigidity   Musculoskeletal:         General: No tenderness. Normal range of motion.      Cervical back: Normal range of motion and neck supple.      Right lower leg: No edema.      Left lower leg: No edema.   Skin:     General: Skin is warm and dry.      Coloration: Skin is not jaundiced.   Neurological:      General: No focal deficit present.      Mental Status: He is alert. Mental status is at baseline.   Psychiatric:         Mood and Affect: Mood normal.                  Procedures:  Procedures      Medical Decision Making:      Comorbidities that affect care:    Thyroid disease, Graves' disease, hypertension, hyperthyroidism, kidney stones    External Notes reviewed:    Previous Clinic Note: Family medicine office visit 5/9/2024.  Description: Anxiety, chronic depression      The following orders were " placed and all results were independently analyzed by me:  Orders Placed This Encounter   Procedures    XR Chest 1 View    [place] Draw    High Sensitivity Troponin T    Comprehensive Metabolic Panel    Lipase    BNP    Magnesium    CBC Auto Differential    High Sensitivity Troponin T 2Hr    D-dimer, Quantitative    TSH Rfx On Abnormal To Free T4    NPO Diet NPO Type: Strict NPO    Undress & Gown    Continuous Pulse Oximetry    Oxygen Therapy- Nasal Cannula; Titrate 1-6 LPM Per SpO2; 90 - 95%    ECG 12 Lead ED Triage Standing Order; Chest Pain    ECG 12 Lead ED Triage Standing Order; Chest Pain    Insert Peripheral IV    CBC & Differential    Green Top (Gel)    Lavender Top    Gold Top - SST    Light Blue Top       Medications Given in the Emergency Department:  Medications   sodium chloride 0.9 % flush 10 mL (has no administration in time range)   aspirin chewable tablet 324 mg (324 mg Oral Given 11/18/24 1232)        ED Course:    ED Course as of 11/18/24 1652   Mon Nov 18, 2024   1344 EKG interpretation: Normal sinus rhythm.  Patient does have some borderline diffuse ST depression in 1 and aVL, V2 through V6.  No ST elevation. [RP]   1652 Patient request refill of his propranolol 60 mg twice daily.  He seemed mildly uncertain on the dosage so I did fill it at the 40 mg that I saw all in the EMR. [RP]      ED Course User Index  [RP] Christopher Fregoso MD       Labs:    Lab Results (last 24 hours)       Procedure Component Value Units Date/Time    High Sensitivity Troponin T [[id number]]  (Normal) Collected: 11/18/24 1222    Specimen: Blood Updated: 11/18/24 1249     HS Troponin T 7 ng/L     Narrative:      High Sensitive Troponin T Reference Range:  <14.0 ng/L- Negative Female for AMI  <22.0 ng/L- Negative Male for AMI  >=14 - Abnormal Female indicating possible myocardial injury.  >=22 - Abnormal Male indicating possible myocardial injury.   Clinicians would have to utilize clinical acumen, EKG, Troponin, and  serial changes to determine if it is an Acute Myocardial Infarction or myocardial injury due to an underlying chronic condition.         CBC & Differential [[id number]]  (Abnormal) Collected: 11/18/24 1222    Specimen: Blood Updated: 11/18/24 1229    Narrative:      The following orders were created for panel order CBC & Differential.  Procedure                               Abnormality         Status                     ---------                               -----------         ------                     CBC Auto Differential[[id number]]        Abnormal            Final result                 Please view results for these tests on the individual orders.    Comprehensive Metabolic Panel [[id number]]  (Abnormal) Collected: 11/18/24 1222    Specimen: Blood Updated: 11/18/24 1249     Glucose 99 mg/dL      BUN 8 mg/dL      Creatinine 1.37 mg/dL      Sodium 137 mmol/L      Potassium 3.9 mmol/L      Chloride 100 mmol/L      CO2 21.8 mmol/L      Calcium 9.7 mg/dL      Total Protein 7.9 g/dL      Albumin 4.8 g/dL      ALT (SGPT) 9 U/L      AST (SGOT) 21 U/L      Alkaline Phosphatase 104 U/L      Total Bilirubin 0.4 mg/dL      Globulin 3.1 gm/dL      A/G Ratio 1.5 g/dL      BUN/Creatinine Ratio 5.8     Anion Gap 15.2 mmol/L      eGFR 68.6 mL/min/1.73     Narrative:      GFR Normal >60  Chronic Kidney Disease <60  Kidney Failure <15      Lipase [[id number]]  (Normal) Collected: 11/18/24 1222    Specimen: Blood Updated: 11/18/24 1249     Lipase 27 U/L     BNP [[id number]]  (Normal) Collected: 11/18/24 1222    Specimen: Blood Updated: 11/18/24 1246     proBNP <36.0 pg/mL     Narrative:      This assay is used as an aid in the diagnosis of individuals suspected of having heart failure. It can be used as an aid in the diagnosis of acute decompensated heart failure (ADHF) in patients presenting with signs and symptoms of ADHF to the emergency department (ED). In addition, NT-proBNP of <300 pg/mL indicates ADHF is not likely.    Age  Range Result Interpretation  NT-proBNP Concentration (pg/mL:      <50             Positive            >450                   Gray                 300-450                    Negative             <300    50-75           Positive            >900                  Gray                300-900                  Negative            <300      >75             Positive            >1800                  Gray                300-1800                  Negative            <300    Magnesium [[id number]]  (Normal) Collected: 11/18/24 1222    Specimen: Blood Updated: 11/18/24 1249     Magnesium 1.8 mg/dL     CBC Auto Differential [[id number]]  (Abnormal) Collected: 11/18/24 1222    Specimen: Blood Updated: 11/18/24 1229     WBC 4.85 10*3/mm3      RBC 5.31 10*6/mm3      Hemoglobin 14.4 g/dL      Hematocrit 43.3 %      MCV 81.5 fL      MCH 27.1 pg      MCHC 33.3 g/dL      RDW 12.2 %      RDW-SD 36.3 fl      MPV 11.9 fL      Platelets 191 10*3/mm3      Neutrophil % 63.9 %      Lymphocyte % 28.9 %      Monocyte % 7.2 %      Eosinophil % 0.0 %      Basophil % 0.0 %      Immature Grans % 0.0 %      Neutrophils, Absolute 3.10 10*3/mm3      Lymphocytes, Absolute 1.40 10*3/mm3      Monocytes, Absolute 0.35 10*3/mm3      Eosinophils, Absolute 0.00 10*3/mm3      Basophils, Absolute 0.00 10*3/mm3      Immature Grans, Absolute 0.00 10*3/mm3      nRBC 0.0 /100 WBC     D-dimer, Quantitative [[id number]]  (Normal) Collected: 11/18/24 1222    Specimen: Blood Updated: 11/18/24 1433     D-Dimer, Quantitative <0.27 MCGFEU/mL     Narrative:      According to the assay 's published package insert, a normal (<0.50 MCGFEU/mL) D-dimer result in conjunction with a non-high clinical probability assessment, excludes deep vein thrombosis (DVT) and pulmonary embolism (PE) with high sensitivity.    D-dimer values increase with age and this can make VTE exclusion of an older population difficult. To address this, the [employer], based on  "best available evidence and recent guidelines, recommends that clinicians use age-adjusted D-dimer thresholds in patients greater than 50 years of age with: a) a low probability of PE who do not meet all Pulmonary Embolism Rule Out Criteria, or b) in those with intermediate probability of PE.   The formula for an age-adjusted D-dimer cut-off is \"age/100\".  For example, a 60 year old patient would have an age-adjusted cut-off of 0.60 MCGFEU/mL and an 80 year old 0.80 MCGFEU/mL.    TSH Rfx On Abnormal To Free T4 [[id number]]  (Normal) Collected: 11/18/24 1222    Specimen: Blood Updated: 11/18/24 1420     TSH 4.030 uIU/mL     High Sensitivity Troponin T 2Hr [[id number]]  (Normal) Collected: 11/18/24 1511    Specimen: Blood from Arm, Right Updated: 11/18/24 1536     HS Troponin T 6 ng/L      Troponin T Delta -1 ng/L     Narrative:      High Sensitive Troponin T Reference Range:  <14.0 ng/L- Negative Female for AMI  <22.0 ng/L- Negative Male for AMI  >=14 - Abnormal Female indicating possible myocardial injury.  >=22 - Abnormal Male indicating possible myocardial injury.   Clinicians would have to utilize clinical acumen, EKG, Troponin, and serial changes to determine if it is an Acute Myocardial Infarction or myocardial injury due to an underlying chronic condition.                  Imaging:    XR Chest 1 View    Result Date: 11/18/2024  XR CHEST 1 VW Date of Exam: 11/18/2024 12:44 PM EST Indication: Chest Pain Triage Protocol Comparison: None available. Findings: The lungs are clear bilaterally. The cardiac and mediastinal silhouettes appear normal. No effusion is seen. No pneumothorax is identified.     Impression: No acute cardiopulmonary disease Electronically Signed: John Swain MD  11/18/2024 12:58 PM EST  Workstation ID: [id number]       Differential Diagnosis and Discussion:    Chest Pain:  Based on the patient's signs and symptoms, I considered aortic dissection, myocardial infaction, pulmonary embolism, cardiac " tamponade, pericarditis, pneumothorax, musculoskeletal chest pain and other differential diagnosis as an etiology of the patient's chest pain.     All labs were reviewed and interpreted by me.  All X-rays impressions were independently interpreted by me.  EKG was interpreted by me.    MDM                     Patient Care Considerations:    PERC: I used the PERC score to risk stratify the patient for PE and a CT of the chest was considered but ultimately not indicated in today's visit.      Consultants/Shared Management Plan:    None    Social Determinants of Health:    Patient is independent, reliable, and has access to care.       Disposition and Care Coordination:    Discharged: I considered escalation of care by admitting this patient to the hospital, however the patient has been ruled out for PE and myocardial infarction.  He is hemodynamically stable and well-appearing.    I have explained the patient´s condition, diagnoses and treatment plan based on the information available to me at this time. I have answered questions and addressed any concerns. The patient has a good  understanding of the patient´s diagnosis, condition, and treatment plan as can be expected at this point. The vital signs have been stable. The patient´s condition is stable and appropriate for discharge from the emergency department.      The patient will pursue further outpatient evaluation with the primary care physician or other designated or consulting physician as outlined in the discharge instructions. They are agreeable to this plan of care and follow-up instructions have been explained in detail. The patient has received these instructions in written format and has expressed an understanding of the discharge instructions. The patient is aware that any significant change in condition or worsening of symptoms should prompt an immediate return to this or the closest emergency department or call to 911.    Final diagnoses:   Nonspecific  chest pain        ED Disposition       ED Disposition   Discharge    Condition   Stable    Comment   --               This medical record created using voice recognition software.             Christopher Fregoso MD  11/18/24 1616       Christopher Fregoso MD  11/18/24 2324

## 2024-11-18 NOTE — Clinical Note
[facility] EMERGENCY ROOM  [address] [place] [address]  Phone: [phone]  Fax: [phone]    Javier Arriola was seen and treated in our emergency department on 11/18/2024.  He may return to work on 11/19/2024.         Thank you for choosing [facility].    Christopher Fregoso MD

## 2024-11-20 LAB
QT INTERVAL: 343 MS
QTC INTERVAL: 462 MS

## 2024-12-03 LAB
QT INTERVAL: 369 MS
QTC INTERVAL: 440 MS

## 2025-01-07 ENCOUNTER — APPOINTMENT (OUTPATIENT)
Dept: GENERAL RADIOLOGY | Facility: HOSPITAL | Age: 35
End: 2025-01-07
Payer: MEDICAID

## 2025-01-07 ENCOUNTER — HOSPITAL ENCOUNTER (EMERGENCY)
Facility: HOSPITAL | Age: 35
Discharge: HOME | End: 2025-01-07
Attending: EMERGENCY MEDICINE | Admitting: EMERGENCY MEDICINE
Payer: MEDICAID

## 2025-01-07 VITALS
BODY MASS INDEX: 30.09 KG/M2 | HEIGHT: 77 IN | WEIGHT: 254.85 LBS | HEART RATE: 71 BPM | TEMPERATURE: 97.7 F | SYSTOLIC BLOOD PRESSURE: 141 MMHG | RESPIRATION RATE: 18 BRPM | OXYGEN SATURATION: 99 % | DIASTOLIC BLOOD PRESSURE: 97 MMHG

## 2025-01-07 DIAGNOSIS — R00.2 PALPITATIONS: Primary | ICD-10-CM

## 2025-01-07 LAB
ALBUMIN SERPL-MCNC: 4.2 G/DL (ref 3.5–5.2)
ALBUMIN/GLOB SERPL: 1.4 G/DL
ALP SERPL-CCNC: 86 U/L (ref 39–117)
ALT SERPL W P-5'-P-CCNC: 10 U/L (ref 1–41)
ANION GAP SERPL CALCULATED.3IONS-SCNC: 12.1 MMOL/L (ref 5–15)
AST SERPL-CCNC: 20 U/L (ref 1–40)
BASOPHILS # BLD AUTO: 0 10*3/MM3 (ref 0–0.2)
BASOPHILS NFR BLD AUTO: 0 % (ref 0–1.5)
BILIRUB SERPL-MCNC: 0.3 MG/DL (ref 0–1.2)
BUN SERPL-MCNC: 9 MG/DL (ref 6–20)
BUN/CREAT SERPL: 7.9 (ref 7–25)
CALCIUM SPEC-SCNC: 9.3 MG/DL (ref 8.6–10.5)
CHLORIDE SERPL-SCNC: 104 MMOL/L (ref 98–107)
CO2 SERPL-SCNC: 23.9 MMOL/L (ref 22–29)
CREAT SERPL-MCNC: 1.14 MG/DL (ref 0.76–1.27)
DEPRECATED RDW RBC AUTO: 36.3 FL (ref 37–54)
EGFRCR SERPLBLD CKD-EPI 2021: 85.5 ML/MIN/1.73
EOSINOPHIL # BLD AUTO: 0.01 10*3/MM3 (ref 0–0.4)
EOSINOPHIL NFR BLD AUTO: 0.1 % (ref 0.3–6.2)
ERYTHROCYTE [DISTWIDTH] IN BLOOD BY AUTOMATED COUNT: 12.1 % (ref 12.3–15.4)
GEN 5 1HR TROPONIN T REFLEX: 7 NG/L
GLOBULIN UR ELPH-MCNC: 3 GM/DL
GLUCOSE SERPL-MCNC: 111 MG/DL (ref 65–99)
HCT VFR BLD AUTO: 42.4 % (ref 37.5–51)
HGB BLD-MCNC: 13.9 G/DL (ref 13–17.7)
HOLD SPECIMEN: NORMAL
HOLD SPECIMEN: NORMAL
IMM GRANULOCYTES # BLD AUTO: 0.02 10*3/MM3 (ref 0–0.05)
IMM GRANULOCYTES NFR BLD AUTO: 0.3 % (ref 0–0.5)
LIPASE SERPL-CCNC: 45 U/L (ref 13–60)
LYMPHOCYTES # BLD AUTO: 2.2 10*3/MM3 (ref 0.7–3.1)
LYMPHOCYTES NFR BLD AUTO: 29.7 % (ref 19.6–45.3)
MAGNESIUM SERPL-MCNC: 2 MG/DL (ref 1.6–2.6)
MCH RBC QN AUTO: 27 PG (ref 26.6–33)
MCHC RBC AUTO-ENTMCNC: 32.8 G/DL (ref 31.5–35.7)
MCV RBC AUTO: 82.3 FL (ref 79–97)
MONOCYTES # BLD AUTO: 0.48 10*3/MM3 (ref 0.1–0.9)
MONOCYTES NFR BLD AUTO: 6.5 % (ref 5–12)
NEUTROPHILS NFR BLD AUTO: 4.7 10*3/MM3 (ref 1.7–7)
NEUTROPHILS NFR BLD AUTO: 63.4 % (ref 42.7–76)
NRBC BLD AUTO-RTO: 0 /100 WBC (ref 0–0.2)
NT-PROBNP SERPL-MCNC: <36 PG/ML (ref 0–450)
PLATELET # BLD AUTO: 190 10*3/MM3 (ref 140–450)
PMV BLD AUTO: 11.4 FL (ref 6–12)
POTASSIUM SERPL-SCNC: 3.9 MMOL/L (ref 3.5–5.2)
PROT SERPL-MCNC: 7.2 G/DL (ref 6–8.5)
QT INTERVAL: 397 MS
QTC INTERVAL: 454 MS
RBC # BLD AUTO: 5.15 10*6/MM3 (ref 4.14–5.8)
SODIUM SERPL-SCNC: 140 MMOL/L (ref 136–145)
TROPONIN T NUMERIC DELTA: -3 NG/L
TROPONIN T SERPL HS-MCNC: 10 NG/L
TSH SERPL DL<=0.05 MIU/L-ACNC: 3.46 UIU/ML (ref 0.27–4.2)
WBC NRBC COR # BLD AUTO: 7.41 10*3/MM3 (ref 3.4–10.8)
WHOLE BLOOD HOLD COAG: NORMAL
WHOLE BLOOD HOLD SPECIMEN: NORMAL

## 2025-01-07 PROCEDURE — 83880 ASSAY OF NATRIURETIC PEPTIDE: CPT | Performed by: EMERGENCY MEDICINE

## 2025-01-07 PROCEDURE — 85025 COMPLETE CBC W/AUTO DIFF WBC: CPT | Performed by: EMERGENCY MEDICINE

## 2025-01-07 PROCEDURE — 99284 EMERGENCY DEPT VISIT MOD MDM: CPT

## 2025-01-07 PROCEDURE — 36415 COLL VENOUS BLD VENIPUNCTURE: CPT

## 2025-01-07 PROCEDURE — 83735 ASSAY OF MAGNESIUM: CPT | Performed by: EMERGENCY MEDICINE

## 2025-01-07 PROCEDURE — 80053 COMPREHEN METABOLIC PANEL: CPT | Performed by: EMERGENCY MEDICINE

## 2025-01-07 PROCEDURE — 93005 ELECTROCARDIOGRAM TRACING: CPT

## 2025-01-07 PROCEDURE — 93005 ELECTROCARDIOGRAM TRACING: CPT | Performed by: EMERGENCY MEDICINE

## 2025-01-07 PROCEDURE — 83690 ASSAY OF LIPASE: CPT | Performed by: EMERGENCY MEDICINE

## 2025-01-07 PROCEDURE — 84443 ASSAY THYROID STIM HORMONE: CPT | Performed by: EMERGENCY MEDICINE

## 2025-01-07 PROCEDURE — 84484 ASSAY OF TROPONIN QUANT: CPT | Performed by: EMERGENCY MEDICINE

## 2025-01-07 PROCEDURE — 71045 X-RAY EXAM CHEST 1 VIEW: CPT

## 2025-01-07 RX ORDER — SODIUM CHLORIDE 0.9 % (FLUSH) 0.9 %
10 SYRINGE (ML) INJECTION AS NEEDED
Status: DISCONTINUED | OUTPATIENT
Start: 2025-01-07 | End: 2025-01-07 | Stop reason: HOSPADM

## 2025-01-07 RX ORDER — ASPIRIN 81 MG/1
324 TABLET, CHEWABLE ORAL ONCE
Status: DISCONTINUED | OUTPATIENT
Start: 2025-01-07 | End: 2025-01-07

## 2025-01-07 RX ORDER — METOPROLOL SUCCINATE 25 MG/1
25 TABLET, EXTENDED RELEASE ORAL DAILY
Qty: 90 TABLET | Refills: 0 | Status: SHIPPED | OUTPATIENT
Start: 2025-01-07

## 2025-01-07 NOTE — ED PROVIDER NOTES
"Time: 12:31 PM EST  Date of encounter:  1/7/2025  Independent Historian/Clinical History and Information was obtained by:   Patient  Chief Complaint: Palpitations and dizziness    History is limited by: N/A    History of Present Illness:  Patient is a 34 y.o. year old male who presents to the emergency department for evaluation of palpitations and dizziness.  Patient states symptoms been ongoing for couple weeks but has gotten worse over the last week.  Patient complains of some chest discomfort.  Patient sees heart physic it is beating fast and skipping.  Patient also reports she has had hot and cold flashes and episodes of dizziness.  Patient is when he is had this before spine related to his thyroid.  Patient has a history of Graves' disease.  Patient also reports has had no appetite.  Patient is concerned that his thyroid level is \"offkilter\" and presents to the ER for evaluation.    HPI    Patient Care Team  Primary Care Provider: Joellen Rojas APRN    Past Medical History:     Allergies   Allergen Reactions    Tetracyclines & Related Hives     Past Medical History:   Diagnosis Date    Graves disease     Hypertension     Hyperthyroidism     Kidney stone      Past Surgical History:   Procedure Laterality Date    KIDNEY STONE SURGERY      LUMBAR EPIDURAL INJECTION N/A 11/10/2021    Procedure: LUMBAR EPIDURAL 1ST VISIT lumbar 5-sacral 1;  Surgeon: Tari Soriano MD;  Location: [facility] MAIN OR;  Service: Pain Management;  Laterality: N/A;    MULTIPLE TOOTH EXTRACTIONS      TONSILLECTOMY AND ADENOIDECTOMY       Family History   Problem Relation Age of Onset    Hypothyroidism Mother     Cancer Father         kidney       Home Medications:  Prior to Admission medications    Medication Sig Start Date End Date Taking? Authorizing Provider   propranolol (INDERAL) 40 MG tablet Take 1 tablet by mouth 2 (Two) Times a Day. 11/18/24   Christopher Fregoso MD   buPROPion XL (WELLBUTRIN XL) 150 MG 24 hr tablet Take 1 tablet by " mouth Daily. 23  Nini Messer MD   busPIRone (BUSPAR) 30 MG tablet Take 1 tablet by mouth 3 (Three) Times a Day. 23  Nini Messer MD   cyclobenzaprine (FLEXERIL) 10 MG tablet Take 1 tablet by mouth 2 (Two) Times a Day As Needed for Muscle Spasms. 23  Ninoska Dunlap APRN   DULoxetine (CYMBALTA) 60 MG capsule Take 1 capsule by mouth 2 (Two) Times a Day. 23  Nini Messer MD   HYDROcodone-acetaminophen (NORCO) 5-325 MG per tablet Take 1 tablet by mouth Every 8 (Eight) Hours As Needed for Moderate Pain. Temporary supply 23  Veronica Byrd APRN   meloxicam (MOBIC) 15 MG tablet Take 1 tablet by mouth Daily. 23  Veronica Byrd APRN   ondansetron ODT (ZOFRAN-ODT) 4 MG disintegrating tablet Place 1 tablet on the tongue Every 8 (Eight) Hours As Needed for Nausea or Vomiting. 24  Sujatha Argueta MD   pantoprazole (PROTONIX) 40 MG EC tablet Take 1 tablet by mouth Daily. 24  Sujatha Argueta MD   pregabalin (Lyrica) 200 MG capsule Take 1 capsule by mouth 3 (Three) Times a Day. 23  Veronica Byrd APRN        Social History:   Social History     Tobacco Use    Smoking status: Former     Current packs/day: 0.00     Types: Cigarettes     Start date: 10/28/2011     Quit date: 10/28/2018     Years since quittin.2    Smokeless tobacco: Never    Tobacco comments:     quit 3 years ago   Vaping Use    Vaping status: Never Used   Substance Use Topics    Alcohol use: Not Currently     Comment: socially    Drug use: Never         Review of Systems:  Review of Systems   Constitutional:  Positive for appetite change and chills. Negative for fever.        Hot flashes   HENT:  Negative for congestion, ear pain and sore throat.    Eyes:  Negative for pain.   Respiratory:  Negative for cough, chest tightness and shortness of breath.    Cardiovascular:  Positive for palpitations. Negative for  "chest pain.   Gastrointestinal:  Negative for abdominal pain, diarrhea, nausea and vomiting.   Genitourinary:  Negative for flank pain and hematuria.   Musculoskeletal:  Negative for joint swelling.   Skin:  Negative for pallor.   Neurological:  Positive for dizziness. Negative for seizures and headaches.   All other systems reviewed and are negative.       Physical Exam:  /97 (BP Location: Right arm, Patient Position: Lying)   Pulse 71   Temp 97.7 °F (36.5 °C) (Oral)   Resp 18   Ht 195.6 cm (77\")   Wt 116 kg (254 lb 13.6 oz)   SpO2 99%   BMI 30.22 kg/m²     Physical Exam    Vital signs were reviewed under triage note.  General appearance - Patient appears well-developed and well-nourished.  Patient is in no acute distress.  Head - Normocephalic, atraumatic.  Pupils - Equal, round, reactive to light.  Extraocular muscles are intact.  Conjunctiva is clear.  Nasal - Normal inspection.  No evidence of trauma or epistaxis.  Tympanic membranes - Gray, intact without erythema or retractions.  Oral mucosa - Pink and moist without lesions or erythema.  Uvula is midline.  Chest wall - Atraumatic.  Chest wall is nontender.  There are no vesicular rashes noted.  Neck - Supple.  Trachea was midline.  There is no palpable lymphadenopathy or thyromegaly.  There are no meningeal signs  Lungs - Clear to auscultation and percussion bilaterally.  Heart - Regular rate and rhythm without any murmurs, clicks, or gallops.  Abdomen - Soft.  Bowel sounds are present.  There is no palpable tenderness.  There is no rebound, guarding, or rigidity.  There are no palpable masses.  There are no pulsatile masses.  Back - Spine is straight and midline.  There is no CVA tenderness.  Extremities - Intact x4 with full range of motion.  There is no palpable edema.  Pulses are intact x4 and equal.  Neurologic - Patient is awake, alert, and oriented x3.  Cranial nerves II through XII are grossly intact.  Motor and sensory functions grossly " intact.  Cerebellar function was normal.  Integument - There are no rashes.  There are no petechia or purpura lesions noted.  There are no vesicular lesions noted.           Procedures:  Procedures      Medical Decision Making:      Comorbidities that affect care:    Graves' disease, hypertension, kidney stones    External Notes reviewed:    Previous Clinic Note: Office visit with Joellen Gilmore NP on 5/9/2024 was reviewed by me.      The following orders were placed and all results were independently analyzed by me:  Orders Placed This Encounter   Procedures    XR Chest 1 View    Catharpin Draw    High Sensitivity Troponin T    Comprehensive Metabolic Panel    Lipase    BNP    Magnesium    CBC Auto Differential    High Sensitivity Troponin T 1Hr    TSH Rfx On Abnormal To Free T4    Undress & Gown    Continuous Pulse Oximetry    ECG 12 Lead ED Triage Standing Order; Chest Pain    CBC & Differential    Green Top (Gel)    Lavender Top    Gold Top - SST    Light Blue Top       Medications Given in the Emergency Department:  Medications - No data to display       ED Course:    ED Course as of 01/08/25 2225   Tue Jan 07, 2025   1230 The IN interval is 160 ms.  P waves are normal.  QRS interval is normal.  Axis is at -19 degrees.  There is no acute ischemic ST or T wave change identified.  QT corrected was 454 ms. [TB]      ED Course User Index  [TB] Jose G Antoine DO       The patient was seen and evaluated in the ED by me.  The above history and physical examination was performed as documented.  Diagnostic data was obtained.  Results reviewed.  Findings were discussed with the patient.  Patient stable for discharge home with outpatient treatment and follow-up.  Patient was started on low-dose beta-blocker to treat his palpitations.  Patient is advised to follow-up with his PCP for further outpatient evaluation and workup.    Labs:    Lab Results (last 24 hours)       ** No results found for the last 24 hours. **              Imaging:    No Radiology Exams Resulted Within Past 24 Hours      Differential Diagnosis and Discussion:    Palpitations: Differential diagnosis includes but is not limited to anxiety, atrioventricular blocks, mitral valve disease, hypoxia, coronary artery disease, hypokalemia, anemia, fever, COPD, congestive heart failure, pericarditis, Pricila-Parkinson-White syndrome, pulmonary embolism, SVT, atrial fibrillation, atrial flutter, sinus tachycardia, thyrotoxicosis, and pheochromocytoma.    Labs were collected in the emergency department and all labs were reviewed and interpreted by me.  X-ray were performed in the emergency department and all X-ray impressions were independently interpreted by me.  An EKG was performed and the EKG was interpreted by me.    MDM           Patient Care Considerations:    SEPSIS was considered but is NOT present in the emergency department as SIRS criteria is not present.      Consultants/Shared Management Plan:    None    Social Determinants of Health:    Patient is independent, reliable, and has access to care.       Disposition and Care Coordination:    Discharged: I considered escalation of care by admitting this patient to the hospital, however there were no acute findings to warrant inpatient admission.    I have explained the patient´s condition, diagnoses and treatment plan based on the information available to me at this time. I have answered questions and addressed any concerns. The patient has a good  understanding of the patient´s diagnosis, condition, and treatment plan as can be expected at this point. The vital signs have been stable. The patient´s condition is stable and appropriate for discharge from the emergency department.      The patient will pursue further outpatient evaluation with the primary care physician or other designated or consulting physician as outlined in the discharge instructions. They are agreeable to this plan of care and follow-up instructions  have been explained in detail. The patient has received these instructions in written format and has expressed an understanding of the discharge instructions. The patient is aware that any significant change in condition or worsening of symptoms should prompt an immediate return to this or the closest emergency department or call to 1.  I have explained discharge medications and the need for follow up with the patient/caretakers. This was also printed in the discharge instructions. Patient was discharged with the following medications and follow up:      Medication List        New Prescriptions      metoprolol succinate XL 25 MG 24 hr tablet  Commonly known as: TOPROL-XL  Take 1 tablet by mouth Daily.            Stop      propranolol 40 MG tablet  Commonly known as: INDERAL               Where to Get Your Medications        These medications were sent to [id number] - [place], [place] - [address] AT [facility] - [phone]  - [phone]   [address] [place] [address]      Phone: [phone]   metoprolol succinate XL 25 MG 24 hr tablet      Joellen Rojas APRN  [address] [address] [phone]    In 1 week         Final diagnoses:   Palpitations        ED Disposition       ED Disposition   Discharge    Condition   Stable    Comment   --               This medical record created using voice recognition software.             Jose G Antoine DO  01/08/25 1237

## 2025-01-07 NOTE — DISCHARGE INSTRUCTIONS
Take the Toprol prescription as prescribed.  Continue with your other home medications.  Follow your primary care provider in 1 week.  Return to the ER for chest pain, increased palpitations, persistent elevated heart rate greater than 140, shortness of breath, or any other concerns issues that may arise.

## 2025-05-01 NOTE — TELEPHONE ENCOUNTER
Reviewed UDS and HERBERT. Both updated and appropriate. Refill appropriate.      
refill
Statement Selected

## (undated) DEVICE — Device: Brand: PORTEX

## (undated) DEVICE — EPIDURAL TRAY: Brand: MEDLINE INDUSTRIES, INC.

## (undated) DEVICE — NDL EPID TUOHY 18G 31/2IN

## (undated) DEVICE — GLV SURG TRIUMPH PF LTX 7 STRL